# Patient Record
Sex: MALE | Race: WHITE | NOT HISPANIC OR LATINO | Employment: OTHER | URBAN - METROPOLITAN AREA
[De-identification: names, ages, dates, MRNs, and addresses within clinical notes are randomized per-mention and may not be internally consistent; named-entity substitution may affect disease eponyms.]

---

## 2017-02-07 ENCOUNTER — OFFICE VISIT (OUTPATIENT)
Dept: UROLOGY | Facility: CLINIC | Age: 67
End: 2017-02-07
Payer: MEDICARE

## 2017-02-07 VITALS
BODY MASS INDEX: 33.34 KG/M2 | HEIGHT: 71 IN | DIASTOLIC BLOOD PRESSURE: 79 MMHG | RESPIRATION RATE: 16 BRPM | HEART RATE: 66 BPM | SYSTOLIC BLOOD PRESSURE: 144 MMHG | WEIGHT: 238.13 LBS

## 2017-02-07 DIAGNOSIS — C61 PROSTATE CANCER: Primary | ICD-10-CM

## 2017-02-07 PROBLEM — I10 HYPERTENSION: Status: ACTIVE | Noted: 2017-02-07

## 2017-02-07 PROCEDURE — 99203 OFFICE O/P NEW LOW 30 MIN: CPT | Mod: PBBFAC | Performed by: UROLOGY

## 2017-02-07 PROCEDURE — 99204 OFFICE O/P NEW MOD 45 MIN: CPT | Mod: S$PBB,,, | Performed by: UROLOGY

## 2017-02-07 PROCEDURE — 99999 PR PBB SHADOW E&M-NEW PATIENT-LVL III: CPT | Mod: PBBFAC,,, | Performed by: UROLOGY

## 2017-02-07 RX ORDER — ALPRAZOLAM 0.25 MG/1
0.25 TABLET ORAL NIGHTLY PRN
COMMUNITY
End: 2017-10-16 | Stop reason: SDUPTHER

## 2017-02-07 RX ORDER — LOSARTAN POTASSIUM 50 MG/1
25 TABLET ORAL DAILY
COMMUNITY
End: 2018-03-01 | Stop reason: SDUPTHER

## 2017-02-07 NOTE — PROGRESS NOTES
Subjective:       Patient ID: Ryder Senior is a 67 y.o. male.    Chief Complaint: Prostate Cancer (mady 7)    HPI Comments: Ryder Senior is a 67 y.o. male recently diagnosed with prostate cancer. Here with his wife Donita.  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 Was initially diagnosed with a Bruner 6 in 2015 and was on active surveillance and just had re-biopsy on 1/12/17 and [place] 3+4.    Stage- T1C  PSAi- 8.7  Vol- 38 ccs, MRI 32 ccs, no median lobe  MRI-PIRADS 3- left anterior TZ, negative extracapsular extension, SV, nodes  Biopsy (1/12/2017)- [place] 3+4 3/3 30%  FIONA score- 3  Intermediate risk    Past Medical History:    Allergy                                                       Elevated PSA                                                  Hypertension           Past Surgical History:    HERNIA REPAIR                                                  KNEE ARTHROSCOPY                                 2002          corpal tunnel                                    2014          ELBOW ARTHROPLASTY                               2012                                                   Review of Systems   Constitutional: Negative for appetite change, chills, fatigue, fever and unexpected weight change.   HENT: Negative for nosebleeds.    Respiratory: Negative for shortness of breath and wheezing.    Cardiovascular: Negative for chest pain, palpitations and leg swelling.   Gastrointestinal: Negative for abdominal distention, abdominal pain, constipation, diarrhea, nausea and vomiting.   Genitourinary: Negative for difficulty urinating, dysuria and hematuria.   Musculoskeletal: Negative for arthralgias and back pain.   Skin: Negative for pallor.   Neurological: Negative for dizziness, seizures and syncope.   Hematological: Negative for adenopathy.   Psychiatric/Behavioral: Negative for dysphoric mood.       Objective:      Physical Exam   Constitutional: He is oriented to person, place, and time. He  appears well-developed and well-nourished. No distress.   HENT:   Head: Atraumatic.   Neck: No tracheal deviation present.   Cardiovascular: Normal rate.    Pulmonary/Chest: Effort normal. No respiratory distress. He has no wheezes.   Abdominal: Soft. Bowel sounds are normal. He exhibits no distension and no mass. There is no tenderness. There is no rebound and no guarding.   Neurological: He is alert and oriented to person, place, and time.   Skin: Skin is warm and dry. He is not diaphoretic.     Psychiatric: He has a normal mood and affect. His behavior is normal. Judgment and thought content normal.       Assessment:       1. Prostate cancer        Plan:         Today's visit was spent almost entirely on counseling. We reviewed his diagnosis, stage, grade, risk group, and prognosis. We discussed D'Amico and FIONA risk stratification We reviewed the [facility] nomogram. We discussed the concept of low risk, moderate risk, and high risk disease. We discussed the different treatment options including active surveillance (as well as the surveillance protocol), prostate brachytherapy, IMRT, IGRT, cryotherapy, and both open and robotic prostatectomy.We also discussed the advantages, disadvantages, risks and benefits, as well as complications of each option. Regarding radiation therapy we discussed treatment planning, the different techniques, short and long term complications. These included radiation cystitis, radiation proctitis, and impotence. We discussed success, failure, and salvage therapeutic options. We discussed surgical therapy in depth including preoperative preparation, surgical technique (including bladder neck and nerve-sparing techniques), postoperative recuperation and recovery, and short and long term complications including UTI, bleeding, blood clots,catheter dislodgement, etc. We discussed the risks of reoperation, incontinence, impotence, and recurrence. We discussed preop and postop Wayne,  post op penile rehab, and treatment options for incontinence and impotence. We discussed rates of cancer free survival and recurrence, as well as salvage therapeutic options. We discussed the possible  indications for adjuvant radiation therapy. I answered questions and addressed concerns. Discussed high intensity focused ultrasound.  I spent over 45 minutes with the patient. Over 50% of the visit was spent in counseling.

## 2017-02-07 NOTE — MR AVS SNAPSHOT
"    [facility]  [address]  [facility] [address]  Phone: [phone]                  Ryder Senior   2017 3:45 PM   Office Visit    Description:  Male : 1950   Provider:  Tyrese Yanes MD   Department:  [facility]           Reason for Visit     Prostate Cancer           Diagnoses this Visit        Comments    Prostate cancer    -  Primary            To Do List           Goals (5 Years of Data)     None      Follow-Up and Disposition     Return in about 8 weeks (around 2017).      [facility]     [facility] On [facility] Nurse Care Line -  Assistance  Registered nurses in the [facility] On Call Center provide clinical advisement, health education, appointment booking, and other advisory services.  Call for this free service at [phone].             Medications           Message regarding Medications     Verify the changes and/or additions to your medication regime listed below are the same as discussed with your clinician today.  If any of these changes or additions are incorrect, please notify your healthcare provider.             Verify that the below list of medications is an accurate representation of the medications you are currently taking.  If none reported, the list may be blank. If incorrect, please contact your healthcare provider. Carry this list with you in case of emergency.           Current Medications     alprazolam (XANAX) 0.25 MG tablet Take 0.25 mg by mouth nightly as needed for Anxiety.    losartan (COZAAR) 50 MG tablet Take 50 mg by mouth once daily.           Clinical Reference Information           Your Vitals Were     BP Pulse Resp Height Weight BMI    144/79 (BP Location: Right arm, Patient Position: Sitting, BP Method: Automatic) 66 16 5' 11" (1.803 m) 108 kg (238 lb 1.6 oz) 33.21 kg/m2      Blood Pressure          Most Recent Value    BP  (!)  144/79      Allergies as of 2017     Coconut    Pineapple      Immunizations " Administered on Date of Encounter - 2/7/2017     None      [facility] Sign-Up     Activating your [facility] account is as easy as 1-2-3!     1) Visit [email or web address], select Sign Up Now, enter this activation code and your date of birth, then select Next.  [email or web address]  Expires: 3/23/2017 12:00 PM      2) Create a username and password to use when you visit [facility] in the future and select a security question in case you lose your password and select Next.    3) Enter your e-mail address and click Sign Up!    Additional Information  If you have questions, please e-mail [email or web address] or call [phone] to talk to our [facility] staff. Remember, [facility] is NOT to be used for urgent needs. For medical emergencies, dial 911.         Language Assistance Services     ATTENTION: Language assistance services are available, free of charge. Please call [phone].      ATENCIÓN: Si habla español, tiene a samuel disposición servicios gratuitos de asistencia lingüística. Llame al [phone].     MARYANN Ý: N?u b?n nói Ti?ng Vi?t, có các d?ch v? h? tr? ngôn ng? mi?n phí dành cho b?n. G?i s? [phone].         [facility] complies with applicable Federal civil rights laws and does not discriminate on the basis of race, color, national origin, age, disability, or sex.

## 2017-04-04 ENCOUNTER — OFFICE VISIT (OUTPATIENT)
Dept: UROLOGY | Facility: CLINIC | Age: 67
End: 2017-04-04
Payer: MEDICARE

## 2017-04-04 VITALS
SYSTOLIC BLOOD PRESSURE: 126 MMHG | HEIGHT: 71 IN | HEART RATE: 67 BPM | WEIGHT: 233.69 LBS | BODY MASS INDEX: 32.72 KG/M2 | DIASTOLIC BLOOD PRESSURE: 70 MMHG

## 2017-04-04 DIAGNOSIS — C61 PROSTATE CANCER: Primary | ICD-10-CM

## 2017-04-04 PROCEDURE — 99213 OFFICE O/P EST LOW 20 MIN: CPT | Mod: PBBFAC | Performed by: UROLOGY

## 2017-04-04 PROCEDURE — 99213 OFFICE O/P EST LOW 20 MIN: CPT | Mod: S$PBB,,, | Performed by: UROLOGY

## 2017-04-04 PROCEDURE — 99999 PR PBB SHADOW E&M-EST. PATIENT-LVL III: CPT | Mod: PBBFAC,,, | Performed by: UROLOGY

## 2017-04-04 NOTE — PROGRESS NOTES
"Clinic Note  4/4/2017      Subjective:       Patient ID:  Alex is a 67 y.o. male being seen for an new visit.      Chief Complaint:   HPI     Ryder Senior is a 67 y.o. male recently diagnosed with prostate cancer. Here with his wife Donita.  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 Was initially diagnosed with a Luis 6 in 2015 and was on active surveillance and just had re-biopsy on 1/12/17 and [place] 3+4.Had visit 2/7/17 for initial talk.     Stage- T1C  PSAi- 8.7  Vol- 38 ccs, MRI 32 ccs, no median lobe  MRI-PIRADS 3- left anterior TZ, negative extracapsular extension, SV, nodes  Biopsy (1/12/2017)- Luis 3+4 3/3 30%  FIONA score- 3  Intermediate risk    Past Medical History:   Diagnosis Date    Allergy     Hypertension      Family History   Problem Relation Age of Onset    Prostate cancer Father     Alzheimer's disease Father     Cancer Mother     Prostate cancer Maternal Grandfather      Social History     Social History    Marital status:      Spouse name: N/A    Number of children: N/A    Years of education: N/A     Occupational History    Not on file.     Social History Main Topics    Smoking status: Never Smoker    Smokeless tobacco: Not on file    Alcohol use 1.2 oz/week     2 Cans of beer per week    Drug use: No    Sexual activity: Not on file     Other Topics Concern    Not on file     Social History Narrative     Past Surgical History:   Procedure Laterality Date    corpal tunnel  2014    ELBOW ARTHROPLASTY  2012    HERNIA REPAIR      KNEE ARTHROSCOPY  2002     Patient Active Problem List   Diagnosis    Prostate cancer    Hypertension     Review of Systems      Objective:      /70 (BP Location: Right arm, Patient Position: Sitting, BP Method: Automatic)  Pulse 67  Ht 5' 11" (1.803 m)  Wt 106 kg (233 lb 11 oz)  BMI 32.59 kg/m2  Estimated body mass index is 32.59 kg/(m^2) as calculated from the following:    Height as of this encounter: 5' 11" (1.803 m).    " Weight as of this encounter: 106 kg (233 lb 11 oz).  Physical Exam   Constitutional: He is oriented to person, place, and time. He appears well-developed and well-nourished. No distress.   HENT:   Head: Atraumatic.   Neck: No tracheal deviation present.   Cardiovascular: Normal rate.    Pulmonary/Chest: Effort normal. No respiratory distress. He has no wheezes.   Abdominal: Soft. Bowel sounds are normal. He exhibits no distension and no mass. There is no tenderness. There is no rebound and no guarding.   Neurological: He is alert and oriented to person, place, and time.   Skin: Skin is warm and dry. He is not diaphoretic.     Psychiatric: He has a normal mood and affect. His behavior is normal. Judgment and thought content normal.         Assessment and Plan:   Discussed options. Patient went to [facility], interested in clinical trial with high frequency radio-ablation.  We will be available as he goes forward with his plan.        Problem List Items Addressed This Visit     Prostate cancer - Primary          Follow up:       Tyrese Yanes

## 2017-04-04 NOTE — MR AVS SNAPSHOT
"    [facility]  [address]  [facility] [address]  Phone: [phone]                  Ryder Senior   2017 2:00 PM   Office Visit    Description:  Male : 1950   Provider:  Tyrese Yanes MD   Department:  [facility]           Diagnoses this Visit        Comments    Prostate cancer    -  Primary            To Do List           Goals (5 Years of Data)     None      Follow-Up and Disposition     Return if symptoms worsen or fail to improve.      [facility]     [facility] On Call Nurse Care Line -  Assistance  Unless otherwise directed by your provider, please contact [facility], our nurse care line that is available for  assistance.     Registered nurses in the [facility] Center provide: appointment scheduling, clinical advisement, health education, and other advisory services.  Call: [phone] (toll free)               Medications           Message regarding Medications     Verify the changes and/or additions to your medication regime listed below are the same as discussed with your clinician today.  If any of these changes or additions are incorrect, please notify your healthcare provider.             Verify that the below list of medications is an accurate representation of the medications you are currently taking.  If none reported, the list may be blank. If incorrect, please contact your healthcare provider. Carry this list with you in case of emergency.           Current Medications     alprazolam (XANAX) 0.25 MG tablet Take 0.25 mg by mouth nightly as needed for Anxiety.    losartan (COZAAR) 50 MG tablet Take 50 mg by mouth once daily.           Clinical Reference Information           Your Vitals Were     BP Pulse Height Weight BMI    126/70 (BP Location: Right arm, Patient Position: Sitting, BP Method: Automatic) 67 5' 11" (1.803 m) 106 kg (233 lb 11 oz) 32.59 kg/m2      Blood Pressure          Most Recent Value    BP  126/70 "      Allergies as of 4/4/2017     Coconut    Pineapple      Immunizations Administered on Date of Encounter - 4/4/2017     None      Language Assistance Services     ATTENTION: Language assistance services are available, free of charge. Please call [phone].      ATENCIÓN: Si habla kayleen, tiene a samuel disposición servicios gratuitos de asistencia lingüística. Llame al [phone].     CHÚ Ý: N?u b?n nói Ti?ng Vi?t, có các d?ch v? h? tr? ngôn ng? mi?n phí dành cho b?n. G?i s? [phone].         [facility] complies with applicable Federal civil rights laws and does not discriminate on the basis of race, color, national origin, age, disability, or sex.

## 2017-07-11 ENCOUNTER — OFFICE VISIT (OUTPATIENT)
Dept: UROLOGY | Facility: CLINIC | Age: 67
End: 2017-07-11
Payer: MEDICARE

## 2017-07-11 VITALS
SYSTOLIC BLOOD PRESSURE: 130 MMHG | HEART RATE: 68 BPM | DIASTOLIC BLOOD PRESSURE: 77 MMHG | RESPIRATION RATE: 16 BRPM | BODY MASS INDEX: 33.02 KG/M2 | HEIGHT: 71 IN | WEIGHT: 235.88 LBS

## 2017-07-11 DIAGNOSIS — C61 PROSTATE CANCER: Primary | ICD-10-CM

## 2017-07-11 PROCEDURE — 1159F MED LIST DOCD IN RCRD: CPT | Mod: ,,, | Performed by: UROLOGY

## 2017-07-11 PROCEDURE — 99999 PR PBB SHADOW E&M-EST. PATIENT-LVL III: CPT | Mod: PBBFAC,,, | Performed by: UROLOGY

## 2017-07-11 PROCEDURE — 99214 OFFICE O/P EST MOD 30 MIN: CPT | Mod: S$PBB,,, | Performed by: UROLOGY

## 2017-07-11 PROCEDURE — 1126F AMNT PAIN NOTED NONE PRSNT: CPT | Mod: ,,, | Performed by: UROLOGY

## 2017-07-11 PROCEDURE — 99213 OFFICE O/P EST LOW 20 MIN: CPT | Mod: PBBFAC | Performed by: UROLOGY

## 2017-07-11 RX ORDER — VITAMIN E 268 MG
400 CAPSULE ORAL DAILY
COMMUNITY
End: 2017-09-28

## 2017-07-11 NOTE — PROGRESS NOTES
"Clinic Note  7/11/2017      Subjective:         Chief Complaint:   HPI  Ryder Senior is a 67 y.o. male recently diagnosed with prostate cancer. Here with his wife Donita.  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   Stage- T1C  PSAi- 8.7  PSAc- 12.65  Vol- 38 ccs, MRI 32 ccs, no median lobe  MRI-PIRADS 3- left anterior TZ, negative extracapsular extension, SV, nodes  Biopsy (1/12/2017)- Luis 3+4 3/3 30%  FIONA score- 4  Intermediate risk      No results found for: PSA, PSADIAG, PSATOTAL, PSAFREE, PSAFREEPCT   Past Medical History:   Diagnosis Date    Allergy     Hypertension     Prostate cancer      Family History   Problem Relation Age of Onset    Prostate cancer Father     Alzheimer's disease Father     Cancer Mother     Prostate cancer Maternal Grandfather      Social History     Social History    Marital status:      Spouse name: N/A    Number of children: N/A    Years of education: N/A     Occupational History    Not on file.     Social History Main Topics    Smoking status: Never Smoker    Smokeless tobacco: Never Used    Alcohol use 1.2 oz/week     2 Cans of beer per week    Drug use: No    Sexual activity: Not on file     Other Topics Concern    Not on file     Social History Narrative    No narrative on file     Past Surgical History:   Procedure Laterality Date    corpal tunnel  2014    ELBOW ARTHROPLASTY  2012    HERNIA REPAIR      KNEE ARTHROSCOPY  2002     Patient Active Problem List   Diagnosis    Prostate cancer    Hypertension     Review of Systems      Objective:      /77   Pulse 68   Resp 16   Ht 5' 11" (1.803 m)   Wt 107 kg (235 lb 14.3 oz)   BMI 32.90 kg/m²   Estimated body mass index is 32.9 kg/m² as calculated from the following:    Height as " "of this encounter: 5' 11" (1.803 m).    Weight as of this encounter: 107 kg (235 lb 14.3 oz).  Physical Exam      Assessment and Plan:           Problem List Items Addressed This Visit     Prostate cancer - Primary      Other Visit Diagnoses    None.         Follow up:   Today's visit was spent almost entirely on counseling. We reviewed his diagnosis, stage, grade, risk group, and prognosis. We discussed D'Amico and FIONA risk stratification We reviewed the [facility] nomogram. We discussed the concept of low risk, moderate risk, and high risk disease. We discussed the different treatment options including active surveillance (as well as the surveillance protocol), prostate brachytherapy, IMRT, IGRT, cryotherapy, and both open and robotic prostatectomy.We also discussed the advantages, disadvantages, risks and benefits, as well as complications of each option. Regarding radiation therapy we discussed treatment planning, the different techniques, short and long term complications. These included radiation cystitis, radiation proctitis, and impotence. We discussed success, failure, and salvage therapeutic options. We discussed surgical therapy in depth including preoperative preparation, surgical technique (including bladder neck and nerve-sparing techniques), postoperative recuperation and recovery, and short and long term complications including UTI, bleeding, blood clots,catheter dislodgement, etc. We discussed the risks of reoperation, incontinence, impotence, and recurrence. We discussed preop and postop Kegels, post op penile rehab, and treatment options for incontinence and impotence. We discussed rates of cancer free survival and recurrence, as well as salvage therapeutic options. We discussed the possible  indications for adjuvant radiation therapy. I answered questions and addressed concerns.   Did not qualify for radiofrequency ablation trial at [place].  MRI prostate to stage. Me after.  I spent 30 " minutes with the patient. Over 50% of the visit was spent in counseling.     Tyrese Yanes

## 2017-07-27 ENCOUNTER — OFFICE VISIT (OUTPATIENT)
Dept: UROLOGY | Facility: CLINIC | Age: 67
End: 2017-07-27
Payer: MEDICARE

## 2017-07-27 ENCOUNTER — TELEPHONE (OUTPATIENT)
Dept: UROLOGY | Facility: CLINIC | Age: 67
End: 2017-07-27

## 2017-07-27 ENCOUNTER — HOSPITAL ENCOUNTER (OUTPATIENT)
Dept: RADIOLOGY | Facility: HOSPITAL | Age: 67
Discharge: HOME | End: 2017-07-27
Attending: UROLOGY
Payer: MEDICARE

## 2017-07-27 VITALS
HEIGHT: 71 IN | HEART RATE: 72 BPM | WEIGHT: 235.88 LBS | BODY MASS INDEX: 33.02 KG/M2 | RESPIRATION RATE: 15 BRPM | SYSTOLIC BLOOD PRESSURE: 135 MMHG | DIASTOLIC BLOOD PRESSURE: 69 MMHG

## 2017-07-27 DIAGNOSIS — C61 PROSTATE CANCER: Primary | ICD-10-CM

## 2017-07-27 DIAGNOSIS — C61 PROSTATE CANCER: ICD-10-CM

## 2017-07-27 LAB
CREAT SERPL-MCNC: 0.8 MG/DL (ref 0.5–1.4)
SAMPLE: NORMAL

## 2017-07-27 PROCEDURE — 99213 OFFICE O/P EST LOW 20 MIN: CPT | Mod: S$PBB,,, | Performed by: UROLOGY

## 2017-07-27 PROCEDURE — 1126F AMNT PAIN NOTED NONE PRSNT: CPT | Mod: ,,, | Performed by: UROLOGY

## 2017-07-27 PROCEDURE — 72197 MRI PELVIS W/O & W/DYE: CPT | Mod: 26,GC,, | Performed by: RADIOLOGY

## 2017-07-27 PROCEDURE — 99999 PR PBB SHADOW E&M-EST. PATIENT-LVL III: CPT | Mod: PBBFAC,,, | Performed by: UROLOGY

## 2017-07-27 PROCEDURE — 1159F MED LIST DOCD IN RCRD: CPT | Mod: ,,, | Performed by: UROLOGY

## 2017-07-27 PROCEDURE — 99213 OFFICE O/P EST LOW 20 MIN: CPT | Mod: PBBFAC,25 | Performed by: UROLOGY

## 2017-07-27 RX ORDER — GADOBUTROL 604.72 MG/ML
10 INJECTION INTRAVENOUS
Status: COMPLETED | OUTPATIENT
Start: 2017-07-27 | End: 2017-07-27

## 2017-07-27 RX ORDER — SELENIUM 50 MCG
50 TABLET ORAL DAILY
COMMUNITY
End: 2017-09-28

## 2017-07-27 RX ADMIN — GADOBUTROL 10 ML: 604.72 INJECTION INTRAVENOUS at 11:07

## 2017-07-27 NOTE — PROGRESS NOTES
Clinic Note  7/27/2017      Subjective:         Chief Complaint:   HPI  Ryder Senior is a 67 y.o. male recently diagnosed with prostate cancer. Here with his wife Donita.  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   Stage- T1C  PSAi- 8.7  PSAc- 12.65  Vol- 38 ccs, MRI 32 ccs, no median lobe  MRI-PIRADS 3- left anterior TZ, negative extracapsular extension, SV, nodes  Biopsy (1/12/2017)- [place] 3+4 3/3 30%  FIONA score- 4  Intermediate risk  Decipher score- 0.29    MRI done today for re-staging. Not reviewed by radiology yet. To my review 1.6 cm PIRADS 5 left anterior negative extracapsular extension, SV, nodes. Will await their review.      No results found for: PSA, PSADIAG, PSATOTAL, PSAFREE, PSAFREEPCT   Past Medical History:   Diagnosis Date    Allergy     Hypertension     Prostate cancer      Family History   Problem Relation Age of Onset    Prostate cancer Father     Alzheimer's disease Father     Cancer Mother     Prostate cancer Maternal Grandfather      Social History     Social History    Marital status:      Spouse name: N/A    Number of children: N/A    Years of education: N/A     Occupational History    Not on file.     Social History Main Topics    Smoking status: Never Smoker    Smokeless tobacco: Never Used    Alcohol use 1.2 oz/week     2 Cans of beer per week    Drug use: No    Sexual activity: Not on file     Other Topics Concern    Not on file     Social History Narrative    No narrative on file     Past Surgical History:   Procedure Laterality Date    corpal tunnel  2014    ELBOW ARTHROPLASTY  2012    HERNIA REPAIR      KNEE ARTHROSCOPY  2002     Patient Active Problem List   Diagnosis    Prostate cancer    Hypertension     Review of Systems      Objective:  "     /69   Pulse 72   Resp 15   Ht 5' 11" (1.803 m)   Wt 107 kg (235 lb 14.3 oz)   BMI 32.90 kg/m²   Estimated body mass index is 32.9 kg/m² as calculated from the following:    Height as of this encounter: 5' 11" (1.803 m).    Weight as of this encounter: 107 kg (235 lb 14.3 oz).  Physical Exam      Assessment and Plan:           Problem List Items Addressed This Visit     Prostate cancer - Primary      Other Visit Diagnoses    None.         Follow up:   Reviewed MRI images and genetic data as well as clinicopathologic features.  Today's visit was spent almost entirely on counseling. We reviewed his diagnosis, stage, grade, risk group, and prognosis. We discussed D'Amico and FIONA risk stratification We reviewed the [facility] nomogram. We discussed the concept of low risk, moderate risk, and high risk disease. We discussed the different treatment options including active surveillance (as well as the surveillance protocol), prostate brachytherapy, IMRT, IGRT, cryotherapy, and both open and robotic prostatectomy.We also discussed the advantages, disadvantages, risks and benefits, as well as complications of each option. Regarding radiation therapy we discussed treatment planning, the different techniques, short and long term complications. These included radiation cystitis, radiation proctitis, and impotence. We discussed success, failure, and salvage therapeutic options. We discussed surgical therapy in depth including preoperative preparation, surgical technique (including bladder neck and nerve-sparing techniques), postoperative recuperation and recovery, and short and long term complications including UTI, bleeding, blood clots,catheter dislodgement, etc. We discussed the risks of reoperation, incontinence, impotence, and recurrence. We discussed preop and postop Kegels, post op penile rehab, and treatment options for incontinence and impotence. We discussed rates of cancer free survival and " recurrence, as well as salvage therapeutic options. We discussed the possible  indications for adjuvant radiation therapy. I answered questions and addressed concerns.   Has camps in [place], [place] and [place]. [place] [facility].  Patient is interested in surgery.  My nurse will instruct the patient on Kegel exercises today and give them the Kegel exercise instruction sheet.   The patient will meet with our  Ree today to find a date for surgery and begin preparation for surgery. Will also receive our handout on NPO guidelines and preop hydration. Patient will also receive information and education on preoperative skin preparation and postop wound care.  I spent 30 minutes with the patient. Over 50% of the visit was spent in counseling.     Tyrese Yanes

## 2017-07-31 ENCOUNTER — TELEPHONE (OUTPATIENT)
Dept: UROLOGY | Facility: CLINIC | Age: 67
End: 2017-07-31

## 2017-07-31 DIAGNOSIS — C61 PROSTATE CANCER: Primary | ICD-10-CM

## 2017-09-12 ENCOUNTER — ANESTHESIA EVENT (OUTPATIENT)
Dept: SURGERY | Facility: HOSPITAL | Age: 67
DRG: 708 | End: 2017-09-12
Payer: MEDICARE

## 2017-09-12 DIAGNOSIS — C61 PROSTATIC CANCER: Primary | ICD-10-CM

## 2017-09-12 DIAGNOSIS — I10 ESSENTIAL HYPERTENSION: ICD-10-CM

## 2017-09-12 NOTE — PRE ADMISSION SCREENING
Anesthesia Assessment: Preoperative EQUATION    Planned Procedure: Procedure(s) (LRB):  ROBOTIC ASSISTED LAPAROSCOPIC PROSTATECTOMY (N/A)  Requested Anesthesia Type:General  Surgeon: Tyrese Yanes MD  Service: Urology  Known or anticipated Date of Surgery:9/25/2017    Surgeon notes: reviewed; Joaquín 7/27/17                   Optimization:  Anesthesia Preop Clinic Assessment  Indicated        Plan:    Testing:  Hematology Profile, CMP, T&S and EKG   Pre-anesthesia  visit       Visit focus: concerns in complex and/or prolonged anesthesia, airway concerns       Navigation: Tests Scheduled.             Results will be tracked by Preop Clinic.

## 2017-09-12 NOTE — ANESTHESIA PREPROCEDURE EVALUATION
Pre Admission Screening  Michael Pena RN      []Hide copied text  []Hover for attribution information  Anesthesia Assessment: Preoperative EQUATION     Planned Procedure: Procedure(s) (LRB):  ROBOTIC ASSISTED LAPAROSCOPIC PROSTATECTOMY (N/A)  Requested Anesthesia Type:General  Surgeon: Tyrese Yanes MD  Service: Urology  Known or anticipated Date of Surgery:9/25/2017     Surgeon notes: reviewed; Joaquín 7/27/17              Optimization:  Anesthesia Preop Clinic Assessment  Indicated                Plan:    Testing:  Hematology Profile, CMP, T&S and EKG   Pre-anesthesia  visit                                        Visit focus: concerns in complex and/or prolonged anesthesia, airway concerns                             Navigation: Tests Scheduled.                        Results will be tracked by Preop Clinic.                                 Electronically signed by Michael Pena RN at 9/12/2017  3:40 PM        Pre-admit on 9/25/2017            Detailed Report                                                                                                                          09/12/2017  Ryder Senior is a 67 y.o., male.    Anesthesia Evaluation         Review of Systems  Anesthesia Hx:  No problems with previous Anesthesia   Social:  Non-Smoker, Alcohol Use 1-2 beers a week   Hematology/Oncology:  Hematology Normal      Current/Recent Cancer. (prostate)   Cardiovascular:   Hypertension Gardening, fishing, cut trees, mow lawn. [place] acres. Very busy. Denies chest pain or sob   Hepatic/GI:   Denies GERD. Denies Liver Disease.    Musculoskeletal:  Denies Musculoskeletal General/Symptoms  Spine Disorders: lumbar    Neurological:   Denies CVA. Denies Seizures.  Denies Pain    Endocrine:  Endocrine Normal    Dermatological:  Skin Normal    Psych:  Psychiatric Normal           Physical Exam  General:  Well nourished    Airway/Jaw/Neck:  Airway Findings: Mouth Opening: Normal Tongue: Normal   General Airway Assessment: Adult  Mallampati: III  Improves to II with phonation.  TM Distance: Normal, at least 6 cm  Jaw/Neck Findings:  Neck ROM: Normal ROM      Dental:  Dental Findings: (dental implant) molar caps   Chest/Lungs:  Chest/Lungs Findings: Clear to auscultation, Normal Respiratory Rate     Heart/Vascular:  Heart Findings: Rate: Normal  Rhythm: Regular Rhythm  Sounds: Normal        Mental Status:  Mental Status Findings:  Alert and Oriented         Labs and ekg reviewed    Isela Escalante RN 09/19/2017        Anesthesia Plan  Type of Anesthesia, risks & benefits discussed:  Anesthesia Type:  general  Patient's Preference: Opioid Sparing General   Intra-op Monitoring Plan: standard ASA monitors  Intra-op Monitoring Plan Comments:   Post Op Pain Control Plan: multimodal analgesia  Post Op Pain Control Plan Comments:   Induction:   IV  Beta Blocker:  Patient is not currently on a Beta-Blocker (No further documentation required).       Informed Consent: Patient understands risks and agrees with Anesthesia plan.  Questions answered. Anesthesia consent signed with patient.  ASA Score: 2     Day of Surgery Review of History & Physical:    H&P update referred to the surgeon.     Anesthesia Plan Notes: NPO confirmed.   No history of anesthesia problems.         Ready For Surgery From Anesthesia Perspective.

## 2017-09-19 ENCOUNTER — OFFICE VISIT (OUTPATIENT)
Dept: UROLOGY | Facility: CLINIC | Age: 67
End: 2017-09-19
Payer: MEDICARE

## 2017-09-19 ENCOUNTER — HOSPITAL ENCOUNTER (OUTPATIENT)
Dept: PREADMISSION TESTING | Facility: HOSPITAL | Age: 67
Discharge: HOME | End: 2017-09-19
Attending: ANESTHESIOLOGY
Payer: MEDICARE

## 2017-09-19 ENCOUNTER — HOSPITAL ENCOUNTER (OUTPATIENT)
Dept: CARDIOLOGY | Facility: CLINIC | Age: 67
Discharge: HOME | End: 2017-09-19
Attending: ANESTHESIOLOGY
Payer: MEDICARE

## 2017-09-19 VITALS
DIASTOLIC BLOOD PRESSURE: 71 MMHG | WEIGHT: 222.31 LBS | TEMPERATURE: 98 F | HEIGHT: 71 IN | SYSTOLIC BLOOD PRESSURE: 144 MMHG | HEART RATE: 65 BPM | OXYGEN SATURATION: 98 % | BODY MASS INDEX: 31.12 KG/M2 | RESPIRATION RATE: 18 BRPM

## 2017-09-19 DIAGNOSIS — I10 ESSENTIAL HYPERTENSION: ICD-10-CM

## 2017-09-19 DIAGNOSIS — C61 PROSTATE CANCER: ICD-10-CM

## 2017-09-19 PROCEDURE — 99499 UNLISTED E&M SERVICE: CPT | Mod: S$PBB,,, | Performed by: ANESTHESIOLOGY

## 2017-09-19 PROCEDURE — 93010 ELECTROCARDIOGRAM REPORT: CPT | Mod: S$PBB,,, | Performed by: INTERNAL MEDICINE

## 2017-09-19 PROCEDURE — 93005 ELECTROCARDIOGRAM TRACING: CPT | Mod: PBBFAC | Performed by: INTERNAL MEDICINE

## 2017-09-19 RX ORDER — SILDENAFIL 50 MG/1
50 TABLET, FILM COATED ORAL DAILY PRN
COMMUNITY
End: 2018-03-08

## 2017-09-19 NOTE — PROGRESS NOTES
Urology ([facility]) H&P for upcoming procedure  Staff:  Dr. Tyrese Yanes MD    Is this patient in a research study?  No    CC: Prostate adencarcinoma    HPI:  Ryder Senior is a 67 y.o. male with [id number] [place] 3+4=7 prostate adenocarcinoma. Initially diagnosed with a [place] 6 in 2015 and was on active surveillance and re-biopsy on 1/12/17 showed Luis 3+4      The patient initially presented with elevated PSA.  TRUS biopsy revealed the following:       LEFT                             RIGHT  BASE   Atypical small acinar proliferation         benign  MID   benign                                        benign  APEX   [place] 3+4 3/3 30%                benign    Date of Biopsy: 1/12/2017  PSA (6/16/2016): 8.7  Volume: 32 cc (MRI) 38cc (TRUS 1/12/2017)  Voiding complaints: present  ED: present - Cialis or Viagra help well  Incontinence: absent      ROS:  Neg except per HPI, specifically no bone pain, no unintentional weight loss, no anorexia, no night sweats, no dysuria, no fevers.      Past Medical History:   Diagnosis Date    Allergy     Hypertension     Prostate cancer        Past Surgical History:   Procedure Laterality Date    corpal tunnel  2014    ELBOW ARTHROPLASTY  2012    HERNIA REPAIR      KNEE ARTHROSCOPY  2002       Social History     Social History    Marital status:      Spouse name: N/A    Number of children: N/A    Years of education: N/A     Social History Main Topics    Smoking status: Never Smoker    Smokeless tobacco: Never Used    Alcohol use 1.2 oz/week     2 Cans of beer per week    Drug use: No    Sexual activity: Not on file     Other Topics Concern    Not on file     Social History Narrative    No narrative on file       Family History   Problem Relation Age of Onset    Prostate cancer Father     Alzheimer's disease Father     Cancer Mother     Prostate cancer Maternal Grandfather        Review of patient's allergies indicates:   Allergen Reactions     Coconut Hives    Pineapple Hives       Current Outpatient Prescriptions on File Prior to Visit   Medication Sig Dispense Refill    alprazolam (XANAX) 0.25 MG tablet Take 0.25 mg by mouth nightly as needed for Anxiety.      GREEN TEA EXTRACT ORAL Take by mouth.      losartan (COZAAR) 50 MG tablet Take 50 mg by mouth once daily.      multivitamin capsule Take 1 capsule by mouth once daily.      selenium 50 mcg Tab Take 50 mcg by mouth once daily.      vitamin E 400 UNIT capsule Take 400 Units by mouth once daily.       No current facility-administered medications on file prior to visit.        Anticoagulation:  No    Physical Exam:    BMI 32.90    AAOx3, NAD  EOMI, PER, sclerae anicteric, speech normal, tongue midline  Nl effort  Regular Rate  Soft, non-tender, non-distended   Scars: 4 inch right lower abdominal transverse scar (inguinal hernia repair with mesh)  Prostate 40g, no nodules  Penis circumcised    Labs:  Urine dipstick today shows negative for all components.      Lab Results   Component Value Date    WBC 6.00 09/19/2017    HGB 15.9 09/19/2017    HCT 45.8 09/19/2017    MCV 84 09/19/2017     09/19/2017         Chemistry        Component Value Date/Time     09/19/2017 1619    K 4.3 09/19/2017 1619     09/19/2017 1619    CO2 25 09/19/2017 1619    BUN 15 09/19/2017 1619    CREATININE 0.9 09/19/2017 1619    GLU 78 09/19/2017 1619        Component Value Date/Time    CALCIUM 8.8 09/19/2017 1619    ALKPHOS 77 09/19/2017 1619    AST 27 09/19/2017 1619    ALT 23 09/19/2017 1619    BILITOT 0.8 09/19/2017 1619    ESTGFRAFRICA >60.0 09/19/2017 1619    EGFRNONAA >60.0 09/19/2017 1619            Imaging:  MRI 7/27/2017: Left anterior mid gland transitional zone lesion bulging the anterior capsule without definite breakthrough      Assessment: Ryder Senior is a 67 y.o. male with [id number] [place] 3+4=7 prostate adenocarcinoma.      Plan:   1. To OR on 9/25/2017 for RALP with BPLND  2. Consents  signed   3. I have explained the risk, benefits, and alternatives of the procedure in detail. The patient voices understanding and all questions have been answered. The patient agrees to proceed as planned.   4. Patient is not anticoagulated.  5. Pre-op Anesthesia clinic per Anesthesia. Losartan rx'ed by Dr. Aime Gerardo (Internal Medicine).    Iris Reaves MD

## 2017-09-19 NOTE — DISCHARGE INSTRUCTIONS
Your surgery has been scheduled for:__________________________________________    You should report to:  ____[facility], located on the [place] side of the first floor of the           [facility] ([phone])  ____[facility], located on the [facility] side of the            Second floor of the [facility] ([phone])  ____3rd Floor [facility] located on the [facility] side of the [facility] [phone]  Please Note   - Tell your doctor if you take Aspirin, products containing Aspirin, herbal medications  or blood thinners, such as Coumadin, Ticlid, or Plavix.  (Consult your provider regarding holding or stopping before surgery).  - Arrange for someone to drive you home following surgery.  You will not be allowed to leave the surgical facility alone or drive yourself home following sedation and anesthesia.  Before Surgery  - Stop taking all herbal medications 14days prior to surgery  - No Motrin/Advil (Ibuprofen) 7 days before surgery  - No Aleve (Naproxen) 7 days before surgery  - Stop Taking Asprin, products containing Asprin _____days before surgery  - Stop taking blood thinners_______days before surgery  - Refrain from drinking alcoholic beverages for 24hours before and after surgery  - Stop or limit smoking _________days before surgery  Night before Surgery  - DO NOT EAT OR DRINK ANYTHING AFTER MIDNIGHT, INCLUDING GUM, HARD CANDY, MINTS, OR CHEWING TOBACCO.  - Take a shower or bath (shower is recommended).  Bathe with Hibiclens soap or an antibacterial soap from the neck down.  If not supplied by your surgeon, hibiclens soap will need to be purchased over the counter in pharmacy.  Rinse soap off thoroughly.  - Shampoo your hair with your regular shampoo  The Day of Surgery  - Take another bath or shower with hibiclens or any antibacterial soap, to reduce the chance of infection.  - Take heart and blood  pressure medications with a small sip of water, as advised by the perioperative team.  - Do not take fluid pills  - You may brush your teeth and rinse your mouth, but do not swall any additional water.   - Do not apply perfumes, powder, body lotions or deodorant on the day of surgery.  - Nail polish should be removed.  - Do not wear makeup or moisturizer  - Wear comfortable clothes, such as a button front shirt and loose fitting pants.  - Leave all jewelry, including body piercings, and valuables at home.    - Bring any devices you will neeed after surgery such as crutches or canes.  - If you have sleep apnea, please bring your CPAP machine  In the event that your physical condition changes including the onset of a cold or respiratory illness, or if you have to delay or cancel your surgery, please notify your surgeon.  Anesthesia: General Anesthesia  Youre due to have surgery. During surgery, youll be given medication called anesthesia. (It is also called anesthetic.) This will keep you comfortable and pain-free. Your anesthesia provider will use general anesthesia. This sheet tells you more about it.  What is general anesthesia?     You are watched continuously during your procedure by the anesthesia provider   General anesthesia puts you into a state like deep sleep. It goes into the bloodstream (IV anesthetics), into the lungs (gas anesthetics), or both. You feel nothing during the procedure. You will not remember it. During the procedure, the anesthesia provider monitors you continuously. He or she checks your heart rate and rhythm, blood pressure, breathing, and blood oxygen.  · IV Anesthetics. IV anesthetics are given through an IV line in your arm. Theyre often given first. This is so you are asleep before a gas anesthetic is started. Some kinds of IV anesthetics relieve pain. Others relax you. Your doctor will decide which kind is best in your case.  · Gas Anesthetics. Gas anesthetics are breathed into  the lungs. They are often used to keep you asleep. They can be given through a facemask or a tube placed in your larynx or trachea (breathing tube).  ? If you have a facemask, your anesthesia provider will most likely place it over your nose and mouth while youre still awake. Youll breathe oxygen through the mask as your IV anesthetic is started. Gas anesthetic may be added through the mask.  ? If you have a tube in the larynx or trachea, it will be inserted into your throat after youre asleep.  Anesthesia tools and medications  You will likely have:  · IV anesthetics. These are put into an IV line into your bloodstream.  · Gas anesthetics. You breathe these anesthetics into your lungs, where they pass into your bloodstream.  · Pulse oximeter. This is a small clip that is attached to the end of your finger. This measures your blood oxygen level.  · Electrocardiography leads (electrodes). These are small sticky pads that are placed on your chest. They record your heart rate and rhythm.  · Blood pressure cuff. This reads your blood pressure.  Risks and possible complications  General anesthesia has some risks. These include:  · Breathing problems  · Nausea and vomiting  · Sore throat or hoarseness (usually temporary)  · Allergic reaction to the anesthetic  · Irregular heartbeat (rare)  · Cardiac arrest (rare)   Anesthesia safety  · Follow all instructions you are given for how long not to eat or drink before your procedure.  · Be sure your doctor knows what medications and drugs you take. This includes over-the-counter medications, herbs, supplements, alcohol or other drugs. You will be asked when those were last taken.  · Have an adult family member or friend drive you home after the procedure.  · For the first 24 hours after your surgery:  ? Do not drive or use heavy equipment.  ? Have a trusted family member or spouse make important decisions or sign documents.  ? Avoid alcohol.  ? Have a responsible adult stay  with you. He or she can watch for problems and help keep you safe.  Date Last Reviewed: 10/16/2014  © 8628-3802 [employer]. [address] [place] [address], [place] [address]. All rights reserved. This information is not intended as a substitute for professional medical care. Always follow your healthcare professional's instructions.

## 2017-09-22 ENCOUNTER — TELEPHONE (OUTPATIENT)
Dept: UROLOGY | Facility: CLINIC | Age: 67
End: 2017-09-22

## 2017-09-25 ENCOUNTER — HOSPITAL ENCOUNTER (INPATIENT)
Facility: HOSPITAL | Age: 67
LOS: 1 days | Discharge: HOME | DRG: 708 | End: 2017-09-26
Attending: UROLOGY | Admitting: UROLOGY
Payer: MEDICARE

## 2017-09-25 ENCOUNTER — SURGERY (OUTPATIENT)
Age: 67
End: 2017-09-25

## 2017-09-25 ENCOUNTER — ANESTHESIA (OUTPATIENT)
Dept: SURGERY | Facility: HOSPITAL | Age: 67
DRG: 708 | End: 2017-09-25
Payer: MEDICARE

## 2017-09-25 DIAGNOSIS — C61 PROSTATE CANCER: ICD-10-CM

## 2017-09-25 PROCEDURE — 8E0W4CZ ROBOTIC ASSISTED PROCEDURE OF TRUNK REGION, PERCUTANEOUS ENDOSCOPIC APPROACH: ICD-10-PCS | Performed by: UROLOGY

## 2017-09-25 PROCEDURE — 27000221 HC OXYGEN, UP TO 24 HOURS

## 2017-09-25 PROCEDURE — 63600175 PHARM REV CODE 636 W HCPCS: Performed by: STUDENT IN AN ORGANIZED HEALTH CARE EDUCATION/TRAINING PROGRAM

## 2017-09-25 PROCEDURE — 63600175 PHARM REV CODE 636 W HCPCS: Performed by: ANESTHESIOLOGY

## 2017-09-25 PROCEDURE — 63600175 PHARM REV CODE 636 W HCPCS: Performed by: NURSE ANESTHETIST, CERTIFIED REGISTERED

## 2017-09-25 PROCEDURE — 0VT04ZZ RESECTION OF PROSTATE, PERCUTANEOUS ENDOSCOPIC APPROACH: ICD-10-PCS | Performed by: UROLOGY

## 2017-09-25 PROCEDURE — 71000039 HC RECOVERY, EACH ADD'L HOUR: Performed by: UROLOGY

## 2017-09-25 PROCEDURE — C1729 CATH, DRAINAGE: HCPCS | Performed by: UROLOGY

## 2017-09-25 PROCEDURE — 25000003 PHARM REV CODE 250: Performed by: UROLOGY

## 2017-09-25 PROCEDURE — 07TC4ZZ RESECTION OF PELVIS LYMPHATIC, PERCUTANEOUS ENDOSCOPIC APPROACH: ICD-10-PCS | Performed by: UROLOGY

## 2017-09-25 PROCEDURE — 55866 LAPS SURG PRST8ECT RPBIC RAD: CPT | Mod: GC,,, | Performed by: UROLOGY

## 2017-09-25 PROCEDURE — 38571 LAPAROSCOPY LYMPHADENECTOMY: CPT | Mod: 51,GC,, | Performed by: UROLOGY

## 2017-09-25 PROCEDURE — 63600175 PHARM REV CODE 636 W HCPCS: Performed by: UROLOGY

## 2017-09-25 PROCEDURE — 0VT34ZZ RESECTION OF BILATERAL SEMINAL VESICLES, PERCUTANEOUS ENDOSCOPIC APPROACH: ICD-10-PCS | Performed by: UROLOGY

## 2017-09-25 PROCEDURE — 88305 TISSUE EXAM BY PATHOLOGIST: CPT | Performed by: PATHOLOGY

## 2017-09-25 PROCEDURE — 36000713 HC OR TIME LEV V EA ADD 15 MIN: Performed by: UROLOGY

## 2017-09-25 PROCEDURE — 25000003 PHARM REV CODE 250: Performed by: NURSE ANESTHETIST, CERTIFIED REGISTERED

## 2017-09-25 PROCEDURE — 36000712 HC OR TIME LEV V 1ST 15 MIN: Performed by: UROLOGY

## 2017-09-25 PROCEDURE — D9220A PRA ANESTHESIA: Mod: ANES,,, | Performed by: ANESTHESIOLOGY

## 2017-09-25 PROCEDURE — 71000033 HC RECOVERY, INTIAL HOUR: Performed by: UROLOGY

## 2017-09-25 PROCEDURE — 37000009 HC ANESTHESIA EA ADD 15 MINS: Performed by: UROLOGY

## 2017-09-25 PROCEDURE — 0TBC4ZX EXCISION OF BLADDER NECK, PERCUTANEOUS ENDOSCOPIC APPROACH, DIAGNOSTIC: ICD-10-PCS | Performed by: UROLOGY

## 2017-09-25 PROCEDURE — 88309 TISSUE EXAM BY PATHOLOGIST: CPT | Mod: 26,,, | Performed by: PATHOLOGY

## 2017-09-25 PROCEDURE — 11000001 HC ACUTE MED/SURG PRIVATE ROOM

## 2017-09-25 PROCEDURE — 37000008 HC ANESTHESIA 1ST 15 MINUTES: Performed by: UROLOGY

## 2017-09-25 PROCEDURE — 27201423 OPTIME MED/SURG SUP & DEVICES STERILE SUPPLY: Performed by: UROLOGY

## 2017-09-25 PROCEDURE — 94760 N-INVAS EAR/PLS OXIMETRY 1: CPT

## 2017-09-25 PROCEDURE — 88305 TISSUE EXAM BY PATHOLOGIST: CPT | Mod: 26,,, | Performed by: PATHOLOGY

## 2017-09-25 PROCEDURE — D9220A PRA ANESTHESIA: Mod: CRNA,,, | Performed by: NURSE ANESTHETIST, CERTIFIED REGISTERED

## 2017-09-25 PROCEDURE — A4216 STERILE WATER/SALINE, 10 ML: HCPCS | Performed by: NURSE ANESTHETIST, CERTIFIED REGISTERED

## 2017-09-25 RX ORDER — GLYCOPYRROLATE 0.2 MG/ML
INJECTION INTRAMUSCULAR; INTRAVENOUS
Status: DISCONTINUED | OUTPATIENT
Start: 2017-09-25 | End: 2017-09-25

## 2017-09-25 RX ORDER — ONDANSETRON 2 MG/ML
INJECTION INTRAMUSCULAR; INTRAVENOUS
Status: DISCONTINUED | OUTPATIENT
Start: 2017-09-25 | End: 2017-09-25

## 2017-09-25 RX ORDER — FENTANYL CITRATE 50 UG/ML
INJECTION, SOLUTION INTRAMUSCULAR; INTRAVENOUS
Status: DISCONTINUED | OUTPATIENT
Start: 2017-09-25 | End: 2017-09-25

## 2017-09-25 RX ORDER — HYDROMORPHONE HYDROCHLORIDE 1 MG/ML
1 INJECTION, SOLUTION INTRAMUSCULAR; INTRAVENOUS; SUBCUTANEOUS
Status: DISCONTINUED | OUTPATIENT
Start: 2017-09-25 | End: 2017-09-26 | Stop reason: HOSPADM

## 2017-09-25 RX ORDER — HYDROMORPHONE HYDROCHLORIDE 1 MG/ML
0.2 INJECTION, SOLUTION INTRAMUSCULAR; INTRAVENOUS; SUBCUTANEOUS EVERY 5 MIN PRN
Status: DISCONTINUED | OUTPATIENT
Start: 2017-09-25 | End: 2017-09-25 | Stop reason: HOSPADM

## 2017-09-25 RX ORDER — OXYCODONE HYDROCHLORIDE 5 MG/1
5 TABLET ORAL EVERY 4 HOURS PRN
Status: DISCONTINUED | OUTPATIENT
Start: 2017-09-25 | End: 2017-09-26 | Stop reason: HOSPADM

## 2017-09-25 RX ORDER — KETOROLAC TROMETHAMINE 15 MG/ML
15 INJECTION, SOLUTION INTRAMUSCULAR; INTRAVENOUS EVERY 6 HOURS
Status: COMPLETED | OUTPATIENT
Start: 2017-09-25 | End: 2017-09-26

## 2017-09-25 RX ORDER — NEOSTIGMINE METHYLSULFATE 1 MG/ML
INJECTION, SOLUTION INTRAVENOUS
Status: DISCONTINUED | OUTPATIENT
Start: 2017-09-25 | End: 2017-09-25

## 2017-09-25 RX ORDER — ROCURONIUM BROMIDE 10 MG/ML
INJECTION, SOLUTION INTRAVENOUS
Status: DISCONTINUED | OUTPATIENT
Start: 2017-09-25 | End: 2017-09-25

## 2017-09-25 RX ORDER — PROPOFOL 10 MG/ML
VIAL (ML) INTRAVENOUS
Status: DISCONTINUED | OUTPATIENT
Start: 2017-09-25 | End: 2017-09-25

## 2017-09-25 RX ORDER — SODIUM CHLORIDE 0.9 % (FLUSH) 0.9 %
3 SYRINGE (ML) INJECTION
Status: DISCONTINUED | OUTPATIENT
Start: 2017-09-25 | End: 2017-09-25 | Stop reason: HOSPADM

## 2017-09-25 RX ORDER — DEXAMETHASONE SODIUM PHOSPHATE 4 MG/ML
INJECTION, SOLUTION INTRA-ARTICULAR; INTRALESIONAL; INTRAMUSCULAR; INTRAVENOUS; SOFT TISSUE
Status: DISCONTINUED | OUTPATIENT
Start: 2017-09-25 | End: 2017-09-25

## 2017-09-25 RX ORDER — ACETAMINOPHEN 10 MG/ML
INJECTION, SOLUTION INTRAVENOUS
Status: DISCONTINUED | OUTPATIENT
Start: 2017-09-25 | End: 2017-09-25

## 2017-09-25 RX ORDER — SODIUM CHLORIDE 9 MG/ML
INJECTION, SOLUTION INTRAVENOUS CONTINUOUS
Status: DISCONTINUED | OUTPATIENT
Start: 2017-09-25 | End: 2017-09-26

## 2017-09-25 RX ORDER — ONDANSETRON 2 MG/ML
4 INJECTION INTRAMUSCULAR; INTRAVENOUS DAILY PRN
Status: DISCONTINUED | OUTPATIENT
Start: 2017-09-25 | End: 2017-09-25 | Stop reason: HOSPADM

## 2017-09-25 RX ORDER — KETAMINE HYDROCHLORIDE 100 MG/ML
INJECTION, SOLUTION INTRAMUSCULAR; INTRAVENOUS
Status: DISCONTINUED | OUTPATIENT
Start: 2017-09-25 | End: 2017-09-25

## 2017-09-25 RX ORDER — ACETAMINOPHEN 10 MG/ML
1000 INJECTION, SOLUTION INTRAVENOUS EVERY 8 HOURS
Status: COMPLETED | OUTPATIENT
Start: 2017-09-25 | End: 2017-09-26

## 2017-09-25 RX ORDER — LIDOCAINE HYDROCHLORIDE ANHYDROUS AND DEXTROSE MONOHYDRATE .8; 5 G/100ML; G/100ML
INJECTION, SOLUTION INTRAVENOUS CONTINUOUS PRN
Status: DISCONTINUED | OUTPATIENT
Start: 2017-09-25 | End: 2017-09-25

## 2017-09-25 RX ORDER — LOSARTAN POTASSIUM 25 MG/1
25 TABLET ORAL DAILY
Status: DISCONTINUED | OUTPATIENT
Start: 2017-09-25 | End: 2017-09-26 | Stop reason: HOSPADM

## 2017-09-25 RX ORDER — LIDOCAINE HCL/PF 100 MG/5ML
SYRINGE (ML) INTRAVENOUS
Status: DISCONTINUED | OUTPATIENT
Start: 2017-09-25 | End: 2017-09-25

## 2017-09-25 RX ORDER — BUPIVACAINE HYDROCHLORIDE 2.5 MG/ML
INJECTION, SOLUTION EPIDURAL; INFILTRATION; INTRACAUDAL
Status: DISCONTINUED | OUTPATIENT
Start: 2017-09-25 | End: 2017-09-25 | Stop reason: HOSPADM

## 2017-09-25 RX ORDER — HYOSCYAMINE SULFATE 0.12 MG/1
0.12 TABLET SUBLINGUAL EVERY 4 HOURS PRN
Status: DISCONTINUED | OUTPATIENT
Start: 2017-09-25 | End: 2017-09-26 | Stop reason: HOSPADM

## 2017-09-25 RX ORDER — KETOROLAC TROMETHAMINE 30 MG/ML
INJECTION, SOLUTION INTRAMUSCULAR; INTRAVENOUS
Status: DISCONTINUED | OUTPATIENT
Start: 2017-09-25 | End: 2017-09-25

## 2017-09-25 RX ORDER — ALPRAZOLAM 0.25 MG/1
0.25 TABLET ORAL NIGHTLY PRN
Status: DISCONTINUED | OUTPATIENT
Start: 2017-09-25 | End: 2017-09-26 | Stop reason: HOSPADM

## 2017-09-25 RX ORDER — MIDAZOLAM HYDROCHLORIDE 1 MG/ML
INJECTION, SOLUTION INTRAMUSCULAR; INTRAVENOUS
Status: DISCONTINUED | OUTPATIENT
Start: 2017-09-25 | End: 2017-09-25

## 2017-09-25 RX ORDER — ONDANSETRON 2 MG/ML
4 INJECTION INTRAMUSCULAR; INTRAVENOUS EVERY 6 HOURS PRN
Status: DISCONTINUED | OUTPATIENT
Start: 2017-09-25 | End: 2017-09-26 | Stop reason: HOSPADM

## 2017-09-25 RX ORDER — OXYCODONE HYDROCHLORIDE 5 MG/1
10 TABLET ORAL EVERY 4 HOURS PRN
Status: DISCONTINUED | OUTPATIENT
Start: 2017-09-25 | End: 2017-09-26 | Stop reason: HOSPADM

## 2017-09-25 RX ORDER — SODIUM CHLORIDE 9 MG/ML
INJECTION, SOLUTION INTRAVENOUS CONTINUOUS PRN
Status: DISCONTINUED | OUTPATIENT
Start: 2017-09-25 | End: 2017-09-25

## 2017-09-25 RX ADMIN — HYDROMORPHONE HYDROCHLORIDE 0.2 MG: 1 INJECTION, SOLUTION INTRAMUSCULAR; INTRAVENOUS; SUBCUTANEOUS at 12:09

## 2017-09-25 RX ADMIN — EPHEDRINE SULFATE 10 MG: 50 INJECTION, SOLUTION INTRAMUSCULAR; INTRAVENOUS; SUBCUTANEOUS at 08:09

## 2017-09-25 RX ADMIN — KETAMINE HYDROCHLORIDE 40 MG: 100 INJECTION, SOLUTION, CONCENTRATE INTRAMUSCULAR; INTRAVENOUS at 07:09

## 2017-09-25 RX ADMIN — PROPOFOL 90 MG: 10 INJECTION, EMULSION INTRAVENOUS at 07:09

## 2017-09-25 RX ADMIN — ROCURONIUM BROMIDE 50 MG: 10 INJECTION, SOLUTION INTRAVENOUS at 07:09

## 2017-09-25 RX ADMIN — OXYCODONE HYDROCHLORIDE 10 MG: 5 TABLET ORAL at 11:09

## 2017-09-25 RX ADMIN — SODIUM CHLORIDE: 0.9 INJECTION, SOLUTION INTRAVENOUS at 10:09

## 2017-09-25 RX ADMIN — MIDAZOLAM HYDROCHLORIDE 2 MG: 1 INJECTION, SOLUTION INTRAMUSCULAR; INTRAVENOUS at 07:09

## 2017-09-25 RX ADMIN — ROCURONIUM BROMIDE 20 MG: 10 INJECTION, SOLUTION INTRAVENOUS at 08:09

## 2017-09-25 RX ADMIN — MIDAZOLAM HYDROCHLORIDE 1 MG: 1 INJECTION, SOLUTION INTRAMUSCULAR; INTRAVENOUS at 09:09

## 2017-09-25 RX ADMIN — HYOSCYAMINE SULFATE 0.12 MG: 0.12 TABLET ORAL; SUBLINGUAL at 12:09

## 2017-09-25 RX ADMIN — KETOROLAC TROMETHAMINE 15 MG: 15 INJECTION, SOLUTION INTRAMUSCULAR; INTRAVENOUS at 12:09

## 2017-09-25 RX ADMIN — Medication 2 G: at 07:09

## 2017-09-25 RX ADMIN — LIDOCAINE HYDROCHLORIDE 100 MG: 20 INJECTION, SOLUTION INTRAVENOUS at 07:09

## 2017-09-25 RX ADMIN — SODIUM CHLORIDE, SODIUM GLUCONATE, SODIUM ACETATE, POTASSIUM CHLORIDE, MAGNESIUM CHLORIDE, SODIUM PHOSPHATE, DIBASIC, AND POTASSIUM PHOSPHATE: .53; .5; .37; .037; .03; .012; .00082 INJECTION, SOLUTION INTRAVENOUS at 07:09

## 2017-09-25 RX ADMIN — ACETAMINOPHEN 1000 MG: 10 INJECTION, SOLUTION INTRAVENOUS at 07:09

## 2017-09-25 RX ADMIN — MIDAZOLAM HYDROCHLORIDE 1 MG: 1 INJECTION, SOLUTION INTRAMUSCULAR; INTRAVENOUS at 08:09

## 2017-09-25 RX ADMIN — OXYCODONE HYDROCHLORIDE 10 MG: 5 TABLET ORAL at 08:09

## 2017-09-25 RX ADMIN — KETAMINE HYDROCHLORIDE 10 MG: 100 INJECTION, SOLUTION, CONCENTRATE INTRAMUSCULAR; INTRAVENOUS at 08:09

## 2017-09-25 RX ADMIN — ONDANSETRON 4 MG: 2 INJECTION INTRAMUSCULAR; INTRAVENOUS at 10:09

## 2017-09-25 RX ADMIN — ACETAMINOPHEN 1000 MG: 10 INJECTION, SOLUTION INTRAVENOUS at 09:09

## 2017-09-25 RX ADMIN — PROPOFOL 110 MG: 10 INJECTION, EMULSION INTRAVENOUS at 07:09

## 2017-09-25 RX ADMIN — ROCURONIUM BROMIDE 10 MG: 10 INJECTION, SOLUTION INTRAVENOUS at 10:09

## 2017-09-25 RX ADMIN — SODIUM CHLORIDE: 0.9 INJECTION, SOLUTION INTRAVENOUS at 05:09

## 2017-09-25 RX ADMIN — KETOROLAC TROMETHAMINE 15 MG: 15 INJECTION, SOLUTION INTRAMUSCULAR; INTRAVENOUS at 11:09

## 2017-09-25 RX ADMIN — FENTANYL CITRATE 100 MCG: 50 INJECTION, SOLUTION INTRAMUSCULAR; INTRAVENOUS at 07:09

## 2017-09-25 RX ADMIN — EPHEDRINE SULFATE 10 MG: 50 INJECTION, SOLUTION INTRAMUSCULAR; INTRAVENOUS; SUBCUTANEOUS at 07:09

## 2017-09-25 RX ADMIN — DEXMEDETOMIDINE HYDROCHLORIDE 0.5 MCG/KG/HR: 100 INJECTION, SOLUTION, CONCENTRATE INTRAVENOUS at 07:09

## 2017-09-25 RX ADMIN — GLYCOPYRROLATE 0.4 MG: 0.2 INJECTION, SOLUTION INTRAMUSCULAR; INTRAVENOUS at 10:09

## 2017-09-25 RX ADMIN — LIDOCAINE HYDROCHLORIDE 0.03 MG/KG/MIN: 8 INJECTION, SOLUTION INTRAVENOUS at 07:09

## 2017-09-25 RX ADMIN — KETOROLAC TROMETHAMINE 30 MG: 30 INJECTION, SOLUTION INTRAMUSCULAR; INTRAVENOUS at 07:09

## 2017-09-25 RX ADMIN — KETOROLAC TROMETHAMINE 15 MG: 15 INJECTION, SOLUTION INTRAMUSCULAR; INTRAVENOUS at 06:09

## 2017-09-25 RX ADMIN — PROPOFOL 50 MG: 10 INJECTION, EMULSION INTRAVENOUS at 10:09

## 2017-09-25 RX ADMIN — ACETAMINOPHEN 1000 MG: 10 INJECTION, SOLUTION INTRAVENOUS at 02:09

## 2017-09-25 RX ADMIN — EPHEDRINE SULFATE 10 MG: 50 INJECTION, SOLUTION INTRAMUSCULAR; INTRAVENOUS; SUBCUTANEOUS at 09:09

## 2017-09-25 RX ADMIN — NEOSTIGMINE METHYLSULFATE 4 MG: 1 INJECTION INTRAVENOUS at 10:09

## 2017-09-25 RX ADMIN — GLYCOPYRROLATE 0.2 MG: 0.2 INJECTION, SOLUTION INTRAMUSCULAR; INTRAVENOUS at 07:09

## 2017-09-25 RX ADMIN — ROCURONIUM BROMIDE 10 MG: 10 INJECTION, SOLUTION INTRAVENOUS at 09:09

## 2017-09-25 RX ADMIN — OXYCODONE HYDROCHLORIDE 10 MG: 5 TABLET ORAL at 04:09

## 2017-09-25 RX ADMIN — DEXAMETHASONE SODIUM PHOSPHATE 8 MG: 4 INJECTION, SOLUTION INTRAMUSCULAR; INTRAVENOUS at 07:09

## 2017-09-25 RX ADMIN — BUPIVACAINE HYDROCHLORIDE 30 ML: 2.5 INJECTION, SOLUTION EPIDURAL; INFILTRATION; INTRACAUDAL; PERINEURAL at 10:09

## 2017-09-25 NOTE — NURSING TRANSFER
Nursing Transfer Note      9/25/2017     Transfer To: [facility]    Transfer via stretcher     Transfer with none    Transported by pct    Medicines sent: iv fluids    Chart send with patient: Yes    Notified: spouse    Patient reassessed at: 9/25/17 see flowsheets

## 2017-09-25 NOTE — H&P (VIEW-ONLY)
Urology ([facility]) H&P for upcoming procedure  Staff:  Dr. Tyrese Yanes MD    Is this patient in a research study?  No    CC: Prostate adencarcinoma    HPI:  Ryder Senior is a 67 y.o. male with [id number] [place] 3+4=7 prostate adenocarcinoma. Initially diagnosed with a [place] 6 in 2015 and was on active surveillance and re-biopsy on 1/12/17 showed Luis 3+4      The patient initially presented with elevated PSA.  TRUS biopsy revealed the following:       LEFT                             RIGHT  BASE   Atypical small acinar proliferation         benign  MID   benign                                        benign  APEX   [place] 3+4 3/3 30%                benign    Date of Biopsy: 1/12/2017  PSA (6/16/2016): 8.7  Volume: 32 cc (MRI) 38cc (TRUS 1/12/2017)  Voiding complaints: present  ED: present - Cialis or Viagra help well  Incontinence: absent      ROS:  Neg except per HPI, specifically no bone pain, no unintentional weight loss, no anorexia, no night sweats, no dysuria, no fevers.      Past Medical History:   Diagnosis Date    Allergy     Hypertension     Prostate cancer        Past Surgical History:   Procedure Laterality Date    corpal tunnel  2014    ELBOW ARTHROPLASTY  2012    HERNIA REPAIR      KNEE ARTHROSCOPY  2002       Social History     Social History    Marital status:      Spouse name: N/A    Number of children: N/A    Years of education: N/A     Social History Main Topics    Smoking status: Never Smoker    Smokeless tobacco: Never Used    Alcohol use 1.2 oz/week     2 Cans of beer per week    Drug use: No    Sexual activity: Not on file     Other Topics Concern    Not on file     Social History Narrative    No narrative on file       Family History   Problem Relation Age of Onset    Prostate cancer Father     Alzheimer's disease Father     Cancer Mother     Prostate cancer Maternal Grandfather        Review of patient's allergies indicates:   Allergen Reactions     Coconut Hives    Pineapple Hives       Current Outpatient Prescriptions on File Prior to Visit   Medication Sig Dispense Refill    alprazolam (XANAX) 0.25 MG tablet Take 0.25 mg by mouth nightly as needed for Anxiety.      GREEN TEA EXTRACT ORAL Take by mouth.      losartan (COZAAR) 50 MG tablet Take 50 mg by mouth once daily.      multivitamin capsule Take 1 capsule by mouth once daily.      selenium 50 mcg Tab Take 50 mcg by mouth once daily.      vitamin E 400 UNIT capsule Take 400 Units by mouth once daily.       No current facility-administered medications on file prior to visit.        Anticoagulation:  No    Physical Exam:    BMI 32.90    AAOx3, NAD  EOMI, PER, sclerae anicteric, speech normal, tongue midline  Nl effort  Regular Rate  Soft, non-tender, non-distended   Scars: 4 inch right lower abdominal transverse scar (inguinal hernia repair with mesh)  Prostate 40g, no nodules  Penis circumcised    Labs:  Urine dipstick today shows negative for all components.      Lab Results   Component Value Date    WBC 6.00 09/19/2017    HGB 15.9 09/19/2017    HCT 45.8 09/19/2017    MCV 84 09/19/2017     09/19/2017         Chemistry        Component Value Date/Time     09/19/2017 1619    K 4.3 09/19/2017 1619     09/19/2017 1619    CO2 25 09/19/2017 1619    BUN 15 09/19/2017 1619    CREATININE 0.9 09/19/2017 1619    GLU 78 09/19/2017 1619        Component Value Date/Time    CALCIUM 8.8 09/19/2017 1619    ALKPHOS 77 09/19/2017 1619    AST 27 09/19/2017 1619    ALT 23 09/19/2017 1619    BILITOT 0.8 09/19/2017 1619    ESTGFRAFRICA >60.0 09/19/2017 1619    EGFRNONAA >60.0 09/19/2017 1619            Imaging:  MRI 7/27/2017: Left anterior mid gland transitional zone lesion bulging the anterior capsule without definite breakthrough      Assessment: Ryder Senior is a 67 y.o. male with [id number] [place] 3+4=7 prostate adenocarcinoma.      Plan:   1. To OR on 9/25/2017 for RALP with BPLND  2. Consents  signed   3. I have explained the risk, benefits, and alternatives of the procedure in detail. The patient voices understanding and all questions have been answered. The patient agrees to proceed as planned.   4. Patient is not anticoagulated.  5. Pre-op Anesthesia clinic per Anesthesia. Losartan rx'ed by Dr. Aime Gerardo (Internal Medicine).    Iris Reaves MD

## 2017-09-25 NOTE — OP NOTE
9/25/2017      Procedure:   1) laparoscopic radical prostatectomy with robotic assistance  2) laparoscopic bilateral pelvic lymph node dissection    Preop diagnosis: Prostate Cancer  Postop diagnosis: Prostate Cancer, Stage T1C    Surgeon: Rehan Yanes MD (present for the entire procedure)  Assistant: Thuy RAMSAY  EBL: 100 ccs    Wound Class: 2    Specimens removed: prostate, seminal vesicles, lymph nodes, bladder neck biopsy    Drain: Monroy      PROCEDURE NOTE:  Preoperatively the H&P were updated. Also confirmed that patient had had their hibiclens shower and preop hydration. After general endotracheal anesthesia, the patient was carefully positioned, padded, prepped and draped.  Positioning and padding was checked by the surgeon and the circulating nurse.  IV antibiotics were administered within 1 hour prior to making initial skin incision.  An OG tube was placed.  A Monroy catheter was placed.  A timeout was called. The patient's identity was confirmed with 2 identifiers.  The correct procedure, allergies, blood products were verified by the entire operative team.                                                                      A Veress needle was placed at the supraumbilical position and the abdomen insufflated to 15 mmHg.  A 12 mm trocar was placed at this site and a 0 degree camera was introduced. The abdominal cavity was carefully inspected. There was no evidence of trauma to the peritoneal contents, nor any evidence of injury to the retroperitoneum.  All accessory trocars were then placed under direct vision.                                                                             The peritoneum posterior to the bladder was incised, the right vas deferens                     identified and divided.  The right seminal vesicle was gently dissected away from surrounding tissue with care being taken not to cause stretch  or thermal injury to the lateral pedicle. A similar procedure was performed  on  the  left side.  Both seminal vesicles were retracted anteriorly. Denonvilliers fascia was incised and this plane of dissection carried down to the level of the apex of the prostate.  A posterior/lateral release was performed bilaterally.                                                                                                                                            An anterior bladder drop was performed.  After dropping the bladder, a right sided pelvic lymph node dissection was completed and this was sent as permanent pathologic specimen #1.  The endopelvic fascia on the right  was gently dissected posteriorly, thus beginning the lateral release.  A left sided pelvic lymph node dissection was performed and this was sent  as permanent pathologic specimen #2.  Again, the endopelvic fascia was   gently dissected posteriorly, completing the lateral release.  A V stitch was used in a figure of eight fashion to control the dorsal venous complex. Excellent hemostasis was noted at this juncture. The plane between the bladder neck and prostate base was developed and the urethra was divided, a bladder neck sparing approach was utilized.  Excellent preservation of the internal sphincter was achieved circumferentially.     The bladder neck biopsy was sent as permanent section specimen #3.      After dividing the posterior bladder neck, the seminal vesicles and vas ampulla were revisualized and retracted anteriorly. The lateral pedicle on the right was controlled with bipolar cautery.      Cold scissors were used to athermically dissect the neurovascular bundle away  from the capsule of the prostate down to the level of the urethra, thus preserving the right neurovascular bundle.      A similar procedure was performed on the left side.    The urethra at the apex was divided preserving maximal urethral length.The rectourethralis was divided. The specimen was placed in the right colic gutter. The pelvis was  irrigated and  carefully inspected.  There was no evidence of rectal trauma and there was excellent hemostasis.  A vesicourethral anastomosis was then performed with a running suture of 3-0 Monocryl.  After completing the anastomosis, a fresh Monroy catheter was advanced into the bladder and the balloon  inflated.  The bladder was irrigated, there was noted to be no extravasation at the anastomosis. The specimen was placed in an endocatch bag.     Throughout the procedure the first assistant assisted with positioning, trocar placement, docking. She also provided assistance with exposure, visualization, traction/countertraction, suction and irrigation.     A drain .was not placed, specimen was removed from the field and port sites were closed.  Needle and sponge counts were correctThe patient was transferred to the Recovery Room in stable condition.

## 2017-09-25 NOTE — PLAN OF CARE
Problem: Patient Care Overview  Goal: Plan of Care Review  Outcome: Ongoing (interventions implemented as appropriate)  Pt alert and oriented to name, , place and situation. Pt bed in lowest position with call light within reach. Safety precautions maintained. No falls observed or reported, at this time. Pt pain assessed and managed. SCDs in place. Pt offered assistance with toileting. Pt voids per tanner catheter to gravity. Will continue to monitor

## 2017-09-25 NOTE — TRANSFER OF CARE
"Anesthesia Transfer of Care Note    Patient: Ryder Senior    Procedure(s) Performed: Procedure(s) (LRB):  ROBOTIC ASSISTED LAPAROSCOPIC PROSTATECTOMY (N/A)    Patient location: PACU    Anesthesia Type: general    Transport from OR: Transported from OR on 6-10 L/min O2 by face mask with adequate spontaneous ventilation    Post pain: adequate analgesia    Post assessment: no apparent anesthetic complications and tolerated procedure well    Post vital signs: stable    Level of consciousness: responds to stimulation and sedated    Nausea/Vomiting: no nausea/vomiting    Complications: none    Transfer of care protocol was followed      Last vitals:   Visit Vitals  BP (!) 96/53 (BP Location: Left arm, Patient Position: Lying)   Pulse (!) 54   Temp 36.2 °C (97.2 °F) (Temporal)   Resp 16   Ht 5' 11" (1.803 m)   Wt 98.9 kg (218 lb)   SpO2 98%   BMI 30.40 kg/m²     "

## 2017-09-26 VITALS
BODY MASS INDEX: 30.52 KG/M2 | TEMPERATURE: 98 F | DIASTOLIC BLOOD PRESSURE: 62 MMHG | OXYGEN SATURATION: 94 % | RESPIRATION RATE: 18 BRPM | HEIGHT: 71 IN | HEART RATE: 63 BPM | SYSTOLIC BLOOD PRESSURE: 112 MMHG | WEIGHT: 218 LBS

## 2017-09-26 PROCEDURE — 3E0234Z INTRODUCTION OF SERUM, TOXOID AND VACCINE INTO MUSCLE, PERCUTANEOUS APPROACH: ICD-10-PCS | Performed by: UROLOGY

## 2017-09-26 PROCEDURE — 90472 IMMUNIZATION ADMIN EACH ADD: CPT | Performed by: UROLOGY

## 2017-09-26 PROCEDURE — 90662 IIV NO PRSV INCREASED AG IM: CPT | Performed by: UROLOGY

## 2017-09-26 PROCEDURE — 90471 IMMUNIZATION ADMIN: CPT | Performed by: UROLOGY

## 2017-09-26 PROCEDURE — 90732 PPSV23 VACC 2 YRS+ SUBQ/IM: CPT | Performed by: UROLOGY

## 2017-09-26 PROCEDURE — 25000003 PHARM REV CODE 250: Performed by: UROLOGY

## 2017-09-26 PROCEDURE — 63600175 PHARM REV CODE 636 W HCPCS: Performed by: UROLOGY

## 2017-09-26 PROCEDURE — G0008 ADMIN INFLUENZA VIRUS VAC: HCPCS | Performed by: UROLOGY

## 2017-09-26 PROCEDURE — G0009 ADMIN PNEUMOCOCCAL VACCINE: HCPCS | Performed by: UROLOGY

## 2017-09-26 RX ORDER — SULFAMETHOXAZOLE AND TRIMETHOPRIM 800; 160 MG/1; MG/1
1 TABLET ORAL 2 TIMES DAILY
Qty: 6 TABLET | Refills: 0 | Status: ON HOLD | OUTPATIENT
Start: 2017-09-26 | End: 2017-10-02 | Stop reason: HOSPADM

## 2017-09-26 RX ORDER — POLYETHYLENE GLYCOL 3350 17 G/17G
17 POWDER, FOR SOLUTION ORAL DAILY
Qty: 30 PACKET | Refills: 0 | Status: SHIPPED | OUTPATIENT
Start: 2017-09-26 | End: 2018-01-04

## 2017-09-26 RX ORDER — ACETAMINOPHEN 10 MG/ML
1000 INJECTION, SOLUTION INTRAVENOUS EVERY 8 HOURS
Status: DISCONTINUED | OUTPATIENT
Start: 2017-09-26 | End: 2017-09-26 | Stop reason: HOSPADM

## 2017-09-26 RX ORDER — OXYCODONE AND ACETAMINOPHEN 5; 325 MG/1; MG/1
1 TABLET ORAL EVERY 4 HOURS PRN
Qty: 41 TABLET | Refills: 0 | Status: SHIPPED | OUTPATIENT
Start: 2017-09-26 | End: 2018-01-04

## 2017-09-26 RX ADMIN — INFLUENZA A VIRUSA/MICHIGAN/45/2015 X-275 (H1N1) ANTIGEN (FORMALDEHYDE INACTIVATED), INFLUENZA A VIRUS A/HONG KONG/4801/2014 X-263B (H3N2) ANTIGEN (FORMALDEHYDE INACTIVATED), AND INFLUENZA B VIRUS B/BRISBANE/60/2008 ANTIGEN (FORMALDEHYDE INACTIVATED) 0.5 ML: 60; 60; 60 INJECTION, SUSPENSION INTRAMUSCULAR at 11:09

## 2017-09-26 RX ADMIN — HYOSCYAMINE SULFATE 0.12 MG: 0.12 TABLET ORAL; SUBLINGUAL at 11:09

## 2017-09-26 RX ADMIN — OXYCODONE HYDROCHLORIDE 10 MG: 5 TABLET ORAL at 05:09

## 2017-09-26 RX ADMIN — OXYCODONE HYDROCHLORIDE 10 MG: 5 TABLET ORAL at 01:09

## 2017-09-26 RX ADMIN — OXYCODONE HYDROCHLORIDE 10 MG: 5 TABLET ORAL at 11:09

## 2017-09-26 RX ADMIN — KETOROLAC TROMETHAMINE 15 MG: 15 INJECTION, SOLUTION INTRAMUSCULAR; INTRAVENOUS at 05:09

## 2017-09-26 RX ADMIN — PNEUMOCOCCAL VACCINE POLYVALENT 0.5 ML
25; 25; 25; 25; 25; 25; 25; 25; 25; 25; 25; 25; 25; 25; 25; 25; 25; 25; 25; 25; 25; 25; 25 INJECTION, SOLUTION INTRAMUSCULAR; SUBCUTANEOUS at 11:09

## 2017-09-26 RX ADMIN — ACETAMINOPHEN 1000 MG: 10 INJECTION, SOLUTION INTRAVENOUS at 05:09

## 2017-09-26 NOTE — ASSESSMENT & PLAN NOTE
- IV tylenol, PO oxycodone for pain control  - regular diet  - dc MIVF  - Ambulate pm of surgery and qid  - Drains: Home with tanner, leg bag,  bag, and teaching  - Prophylaxis: IS, SCDs, GI ppx

## 2017-09-26 NOTE — DISCHARGE INSTRUCTIONS
What to expect with your Robotic Assisted Laparoscopic Prostatectomy.  [facility]  Updated 06/10/2011   Surgery  o Your surgery will last between 1.5 - 3 hours.   After surgery  o You may or may not have a drain that is shaped like a grenade and put to suction  - This drain usually comes out on Post Op Day (POD) 1. It will remain if the output is high and the nurses will teach you how to record the output and you will come back a few days after you leave to have the drain removed  o You will have a catheter after your surgery.  - This catheter protects your tissues to allow for healing between the bladder neck and the urethra now that the prostate has been removed.  - NO ONE IS TO REMOVE THIS CATHETER OR CHANGE THE CATHETER OTHER THAN SOMEONE FROM [facility].  - If you have to go to the ER because your catheter is not draining, come to the [facility] if possible and they will call us if they are not able to irrigate your catheter.  - If you live out of town and have to go to a local ER, then DO NOT let that doctor remove your catheter. If they are unable to irrigate the catheter or are having troubles with it, have them page the [facility] resident on call immediately at [phone] to help answer any questions.  - The catheter should come out in 7-10 days.   - You will have a midline incision after surgery where the prostate was removed. This will be sewn with absorbable suture so you do not need to worry about having the sutures removed.  - You will have smaller incisions where the instruments were inserted that are also sewn closed with absorbable sutures.  o The night of surgery we expect and hope that you will:  - Walk - walking helps get the bowels moving. Also after your surgery, you are at a risk for a deep venous thrombosis (which is a clot in the legs that can form by remaining inactive or still for extended periods of time) and this can travel to your lungs and make you  feel short of breath. This is a very serious condition. Walking helps prevent a DVT from occurring.  - Eat - you do not have to eat a whole meal, but we want to make sure you can tolerate liquid and/or solid food without nausea and vomiting  - Use your incentive spirometer - this is the breathing apparatus that helps you expand your lungs. If and when you have pain you will not want to take deep breaths. But if you dont take deep breaths, you are at risk for pneumonia. The incentive spirometer will help prevent this from occurring by expanding your lungs.  o Symptoms you may experience immediately post-op:  - Bloating and/or shoulder pain - when we do this operation, we fill up your abdominal cavity with gas to better help us visualize the organs and allow our instruments to fit. After the surgery, not all the air can be removed and your body will eventually absorb this small amount of air. However this can make you feel bloated. In addition, when you sit up, the air can sit right under a muscle (the diaphragm) which has connecting nerves to the shoulders, which could explain why you have shoulder pain.  - Do not expect necessarily to have gas or to have a bowel movement - this goes along with the bloating, you may feel like you want to pass gas or have a bowel movement but you cant. This is normal and you will feel like this for a couple days. There are no pills to help with this. Small walks throughout the day should help with this.  - Pain - your pain should be able to be controlled with medicines by mouth that we prescribe. It is important for you to tell us if you are on any pain medications at home before the surgery as you may need stronger pain meds while in the hospital.  - Bladder spasms - this feels like you have to urinate but cant. Sometimes it can be due to clots clogging up your catheter. The nurse will irrigate the catheter, if there are no clots we can prescribe you an anti-spasmodic. We can also  send you home with a prescription for one.   If you go home on anti-spasmodics, do not take one the morning of your appointment to have your catheter removed or you may not be able to void.   You can go home when:  o Pain is controlled with medicines by mouth  o You are able to walk without difficulty or pain  o You are tolerating a regulating diet  o Your catheter is draining freely   When you go home:  o Catheter care  - Blood in your urine (hematuria) is normal. However if you are having clots or your catheter stops draining and you are experiencing abdominal pain, go to the ER.  - If the tip of your penis hurts with the catheter in place, you can put some Vaseline at the end of the catheter to help lubricate the catheter.  o Activity  - Continue to walk - small walks throughout the day are better than one long walk.   - Do not lift anything greater than 8 pounds for 6 weeks - we want your abdominal wall muscles to heal.  o Bowel Movements - Do not strain to have a bowel movement - the pain medicines will make you constipated. That is why we also ask you to take colace 2-3 x per day to help keep your bowels regular. If you are still having trouble, then you can also add Miralax once a day. Do not take any stool softeners if you are having diarrhea.  o Drain - If you have a drain (not your catheter, but a separate drain) then record the output and bring it with you to your next appointment  o Smoking - If you smoke, we encourage you to STOP. Smoking interferes with the healing process and will prolong your healing with continued smoking.  o Driving - Do not drive while you are on pain meds or with your catheter in place.  o Bathing - If you do not have a drain, you can shower 48 hours after your surgery. If you do have a drain, sponge bathe only until the drain is out.  o Dressing - you can remove the dressings if there is no drainage or change them as needed if there is. The little sterile band-aid strips will  fall off on their own in 10-14 days. If they have not fallen off then you can remove them yourself after 14 days.  o Kegels - do not start your Kegels until after your catheter is out.  o Restarting medicines -especially blood thinners (Coumadin, ASA,Plavix), Fish Oil. Discuss this with your physician prior to discharge   When to return to the ER  o Fever - If you have a fever >101.5, this could be due to a number of reasons such as infection of the urine or incision. If your catheter has been removed, you could possibly have a leak. It would be best to come to the ER so they can better evaluate you.  o Severe pain - pain is expected, but severe or new onset of pain is not normal.   o Inability to tolerate food or liquid with nausea and vomiting - it would be best to go to the ER for them to better evaluate you.      Consents to be obtained - Surgical and Blood.  Before you sign the consent, understand the following risks:  o Surgery consent  - Bleeding - the prostate and its surrounding vessels are very vascular and bleeding can occur requiring a blood transfusion.  - Erectile dysfunction (impotence) - you will not have erections after surgery immediately post-op.  Your potency depends on your age and if you were potent beforehand. Nerve-sparing also helps. However, it does not guarantee erectile function. There are options to discuss after surgery if you still remain impotent.  - Incontinence -95% of people regain their continence by one year. How fast you become continent again cannot be determined beforehand. Kegels, however, do help and you should start your kegels after your catheter is out.   - Blockage of ureters (the drainage tube between the kidney and the bladder)- if this occurrs, you will need another procedure to unblock the ureters.  - Stricture of the urethra or Bladder Neck Contracture - any time the urethra is instrumented, there is a risk of having strictures and this would require another  procedure to dilate the stricture. Bladder neck contracture can occur after any type of anastomotic procedure. You would present with difficulty voiding or emptying your bladder.  - Damage to rectal wall, bowel, liver, spleen or any of the surrounding organs   - Infection of urine or incision requiring further treatment  - Pulmonary Emboli (blood clots) - this is why we ask you to walk around after surgery and the night of surgery to help prevent clots  - Need for conversion to open procedure   - Air embolus - to introduce air into the abdomen a needle is inserted into the abdomen, if this enters a vein or artery it can cause an embolus that travels to the heart and cause death.  - Anastomotic leak - there is always a chance that the tissues have not healed when the catheter is removed and you may possibly have a leak. You may possibly present or complain of abdominal pain, weakness or fever. If you have these symptoms return to the ER. If you have a leak you will need a catheter to be replaced, likely under direct vision with a camera.   - Small bowel obstruction (SBO) - after surgery, people can have adhesions that form from inflammation caused by surgery and this can cause a SBO, which would present with nausea and vomiting and unable to pass stool.  - Ileus - your bowels can be in shock after surgery and you will present with nausea and vomiting. Sometimes this requires a tube that is placed into your stomach until your bowels start working again, this is temporary.  - Acute or chronic pain  - Re-insertion of catheter if there is a urinary leak  - Scars  - Death, paralysis from the neck or waist down, loss of limb  o Blood consents  - Risk of HIV and Hepatitis or any other blood borne disease  - Risk of allergic reaction to blood treated symptomatically  -

## 2017-09-26 NOTE — DISCHARGE SUMMARY
[facility]  Urology  Discharge Summary      Patient Name: Ryder Senior  MRN: [id number]  Admission Date: 9/25/2017  Hospital Length of Stay: 1 days  Discharge Date and Time: 9/26/2017 11:38 AM  Attending Physician: Tyrese Yanes MD  Discharging Provider: La Polanco MD  Primary Care Physician: Aime Gerardo MD    HPI:   Ryder Senior is a 67 y.o. male with bBabP6Hh Luis 3+4=7 prostate adenocarcinoma. Initially diagnosed with a Luis 6 in 2015 and was on active surveillance and re-biopsy on 1/12/17 showed Luis 3+4    Procedure(s) (LRB):  ROBOTIC ASSISTED LAPAROSCOPIC PROSTATECTOMY (N/A)     Indwelling Lines/Drains at time of discharge:   Lines/Drains/Airways     Drain                 Urethral Catheter 09/25/17 0743 Straight-tip;Non-latex 18 Fr. 1 day                Hospital Course (synopsis of major diagnoses, care, treatment, and services provided during the course of the hospital stay): Patient was admitted following RALP. He tolerated the procedure well with no complications. On POD 1 his pain was well controlled, he was ambulating and tolerating a regular diet. He was discharged home.     Consults:     Significant Diagnostic Studies: see chart review    Pending Diagnostic Studies:     Procedure Component Value Units Date/Time    Basic metabolic panel [[id number]] Collected:  09/25/17 1041    Order Status:  Sent Lab Status:  In process Updated:  09/25/17 1041    Specimen:  Blood from Blood     CBC auto differential [[id number]] Collected:  09/25/17 1041    Order Status:  Sent Lab Status:  In process Updated:  09/25/17 1041    Specimen:  Blood from Blood           Final Active Diagnoses:    Diagnosis Date Noted POA    PRINCIPAL PROBLEM:  Prostate cancer [C61] 02/07/2017 Yes      Problems Resolved During this Admission:    Diagnosis Date Noted Date Resolved POA       Discharged Condition: good    Disposition: Home or Self Care    Follow Up:  Follow-up Information     Tyrese MUIR  MD Joaquín In 1 week.    Specialty:  Urology  Why:  post op tanner removal  Contact information:  [address]  [facility] [id number]  [phone]                 Patient Instructions:     Diet general     Call MD for:  temperature >100.4     Call MD for:  persistent nausea and vomiting or diarrhea     Call MD for:  severe uncontrolled pain     Call MD for:  redness, tenderness, or signs of infection (pain, swelling, redness, odor or green/yellow discharge around incision site)     Call MD for:  difficulty breathing or increased cough     Call MD for:  severe persistent headache     Call MD for:  worsening rash     Call MD for:  persistent dizziness, light-headedness, or visual disturbances     Call MD for:  increased confusion or weakness     No dressing needed       Medications:  Reconciled Home Medications:   Discharge Medication List as of 9/26/2017 11:33 AM      START taking these medications    Details   oxycodone-acetaminophen (PERCOCET) 5-325 mg per tablet Take 1 tablet by mouth every 4 (four) hours as needed for Pain., Starting Tue 9/26/2017, Print      polyethylene glycol (GLYCOLAX) 17 gram PwPk Take 17 g by mouth once daily., Starting Tue 9/26/2017, Print      sulfamethoxazole-trimethoprim 800-160mg (BACTRIM DS) 800-160 mg Tab Take 1 tablet by mouth 2 (two) times daily., Starting Tue 9/26/2017, Until Fri 9/29/2017, Print         CONTINUE these medications which have NOT CHANGED    Details   alprazolam (XANAX) 0.25 MG tablet Take 0.25 mg by mouth nightly as needed for Anxiety., Until Discontinued, Historical Med      losartan (COZAAR) 50 MG tablet Take 25 mg by mouth once daily. Patient takes 0.5 to 1 whole tablet at night, Historical Med      GREEN TEA EXTRACT ORAL Take by mouth., Historical Med      multivitamin capsule Take 1 capsule by mouth once daily., Historical Med      selenium 50 mcg Tab Take 50 mcg by mouth once daily., Historical Med      sildenafil (VIAGRA) 50 MG tablet Take 50 mg by  mouth daily as needed for Erectile Dysfunction., Historical Med      vitamin E 400 UNIT capsule Take 400 Units by mouth once daily., Historical Med             Time spent on the discharge of patient: 20 minutes    La Polanco MD  Urology  [facility]

## 2017-09-26 NOTE — PROGRESS NOTES
[facility]  Urology  Progress Note    Patient Name: Ryder Senior  MRN: [id number]  Admission Date: 9/25/2017  Hospital Length of Stay: 1 days  Code Status: No Order   Attending Provider: Tyrese Yanes MD   Primary Care Physician: Aime Gerardo MD    Subjective:     HPI:  Ryder Senior is a 67 y.o. male POD 1 s/p RALP    Interval History:   No acute events overnight  Pain well controlled  No nausea, tolerating diet  Ambulated last night    Review of Systems  Objective:     Temp:  [95.5 °F (35.3 °C)-98.4 °F (36.9 °C)] 98.4 °F (36.9 °C)  Pulse:  [46-70] 59  Resp:  [8-18] 18  SpO2:  [94 %-100 %] 94 %  BP: ()/(27-69) 122/57     Body mass index is 30.4 kg/m².            Drains     Drain                 Urethral Catheter 09/25/17 0743 Straight-tip;Non-latex 18 Fr. less than 1 day                Physical Exam   Constitutional: No distress.   HENT:   Head: Normocephalic and atraumatic.   Eyes: Conjunctivae are normal. No scleral icterus.   Neck: Normal range of motion.   Cardiovascular: Normal rate.    Pulmonary/Chest: Effort normal. No respiratory distress.   Abdominal: Soft. He exhibits no distension. There is tenderness (appropriate). There is no rebound and no guarding.   Incisions c/d/i  18 Fr tanner draining clear yellow   Musculoskeletal: Normal range of motion.   SCDs in place   Neurological: He is alert.   Skin: Skin is warm and dry. He is not diaphoretic.     Psychiatric: He has a normal mood and affect.       Significant Labs:    BMP:    Recent Labs  Lab 09/19/17  1619      K 4.3      CO2 25   BUN 15   CREATININE 0.9   CALCIUM 8.8       CBC:     Recent Labs  Lab 09/19/17  1619   WBC 6.00   HGB 15.9   HCT 45.8          All pertinent labs results from the past 24 hours have been reviewed.    Significant Imaging:  All pertinent imaging results/findings from the past 24 hours have been reviewed.        Assessment/Plan:     * Prostate cancer    - IV tylenol, PO  oxycodone for pain control  - regular diet  - dc MIVF  - Ambulate pm of surgery and qid  - Drains: Home with tanner, leg bag,  bag, and teaching  - Prophylaxis: IS, SCDs, GI ppx              VTE Risk Mitigation         Ordered     Medium Risk of VTE  Once      09/25/17 0528     Place sequential compression device  Until discontinued      09/25/17 0528          La Polanco MD  Urology  [facility]

## 2017-09-26 NOTE — PLAN OF CARE
Problem: Patient Care Overview  Goal: Individualization & Mutuality  Outcome: Ongoing (interventions implemented as appropriate)  Pt is progressing with plan of care. Frequent rounds made to assess pain and safety. Pt's pain controlled with pain medication ordered at this time. Bed locked and at lowest position. Side rails up x 2. Call light within reach. Will continue to monitor.

## 2017-09-26 NOTE — SUBJECTIVE & OBJECTIVE
Interval History:   No acute events overnight  Pain well controlled  No nausea, tolerating diet  Ambulated last night    Review of Systems  Objective:     Temp:  [95.5 °F (35.3 °C)-98.4 °F (36.9 °C)] 98.4 °F (36.9 °C)  Pulse:  [46-70] 59  Resp:  [8-18] 18  SpO2:  [94 %-100 %] 94 %  BP: ()/(27-69) 122/57     Body mass index is 30.4 kg/m².            Drains     Drain                 Urethral Catheter 09/25/17 0743 Straight-tip;Non-latex 18 Fr. less than 1 day                Physical Exam   Constitutional: No distress.   HENT:   Head: Normocephalic and atraumatic.   Eyes: Conjunctivae are normal. No scleral icterus.   Neck: Normal range of motion.   Cardiovascular: Normal rate.    Pulmonary/Chest: Effort normal. No respiratory distress.   Abdominal: Soft. He exhibits no distension. There is tenderness (appropriate). There is no rebound and no guarding.   Incisions c/d/i  18 Fr tanner draining clear yellow   Musculoskeletal: Normal range of motion.   SCDs in place   Neurological: He is alert.   Skin: Skin is warm and dry. He is not diaphoretic.     Psychiatric: He has a normal mood and affect.       Significant Labs:    BMP:    Recent Labs  Lab 09/19/17  1619      K 4.3      CO2 25   BUN 15   CREATININE 0.9   CALCIUM 8.8       CBC:     Recent Labs  Lab 09/19/17  1619   WBC 6.00   HGB 15.9   HCT 45.8          All pertinent labs results from the past 24 hours have been reviewed.    Significant Imaging:  All pertinent imaging results/findings from the past 24 hours have been reviewed.

## 2017-09-26 NOTE — PROGRESS NOTES
Pt was assisted ambulating in the hallway. Pt walked the length of the hallway then returned to their bed, remaining free from falls or other injuries. Pt tolerated activity well. Will continue to monitor.

## 2017-09-28 PROBLEM — K56.7 ILEUS: Status: ACTIVE | Noted: 2017-09-28

## 2017-09-28 NOTE — ANESTHESIA POSTPROCEDURE EVALUATION
"Anesthesia Post Evaluation    Patient: Ryder Senior    Procedure(s) Performed: Procedure(s) (LRB):  ROBOTIC ASSISTED LAPAROSCOPIC PROSTATECTOMY (N/A)    Final Anesthesia Type: general  Patient location during evaluation: PACU  Patient participation: Yes- Able to Participate  Level of consciousness: awake and alert  Post-procedure vital signs: reviewed and stable  Pain management: adequate  Airway patency: patent  PONV status at discharge: No PONV  Anesthetic complications: no      Cardiovascular status: blood pressure returned to baseline  Respiratory status: unassisted  Hydration status: euvolemic  Follow-up not needed.        Visit Vitals  /62 (BP Location: Right arm, Patient Position: Lying)   Pulse 63   Temp 36.5 °C (97.7 °F) (Oral)   Resp 18   Ht 5' 11" (1.803 m)   Wt 98.9 kg (218 lb)   SpO2 (!) 94%   BMI 30.40 kg/m²       Pain/Lisa Score: No Data Recorded      "

## 2017-09-28 NOTE — PHYSICIAN QUERY
PT Name: Ryder Senior  MR #: [id number]     Physician Query Form - Documentation Clarification      CDS/: Jodi Landrum RN  CCDS               Contact information: [email or web address]    This form is a permanent document in the medical record.     Query Date: September 28, 2017    By submitting this query, we are merely seeking further clarification of documentation. Please utilize your independent clinical judgment when addressing the question(s) below.    The Medical record reflects the following:    Supporting Clinical Findings Location in Medical Record    Procedure:   1) laparoscopic radical prostatectomy with robotic assistance   2) laparoscopic bilateral pelvic lymph node dissection    After dropping the bladder, a right sided pelvic lymph node dissection was completed and this was sent as permanent pathologic specimen #1.    A left sided pelvic lymph node dissection was performed and this was sent as permanent pathologic specimen #2    OP note 9/25                                                                              Doctor, Please specify diagnosis or diagnoses associated with above clinical findings.  Please clarify the extent of the bilateral pelvic lymph node dissection:    Provider Use Only      [x] complete bilateral lymph node dissection      [  ] partial bilateral lymph node dissection      [  ] clinically undetermined

## 2017-10-02 ENCOUNTER — TELEPHONE (OUTPATIENT)
Dept: UROLOGY | Facility: CLINIC | Age: 67
End: 2017-10-02

## 2017-10-02 PROBLEM — A04.72 C. DIFFICILE COLITIS: Status: ACTIVE | Noted: 2017-10-02

## 2017-10-02 PROBLEM — K56.7 ILEUS: Status: RESOLVED | Noted: 2017-09-28 | Resolved: 2017-10-02

## 2017-10-02 NOTE — TELEPHONE ENCOUNTER
I spoke with patient, who stated he was readmitted into the hospital for c-diff infection and is not happy with his care there and wanted suggestions, I advised patient that  was in surgery today but that I will send message to  to call patient as soon as he is able..

## 2017-10-03 ENCOUNTER — TELEPHONE (OUTPATIENT)
Dept: UROLOGY | Facility: CLINIC | Age: 67
End: 2017-10-03

## 2017-10-03 NOTE — TELEPHONE ENCOUNTER
Spoke to Milvia MILTON RN. Dr. Workman says to have the pt keep the catheter until Friday. If pt goes home before Friday he will be discharged with the catheter in place and can f/u in the office.

## 2017-10-03 NOTE — TELEPHONE ENCOUNTER
----- Message from Shayy Crow sent at 10/3/2017 10:43 AM CDT -----  Contact: [facility]   States that they have some information about the X-Ray and needs a clearance to take the Monroy out.  Can you please call Kinsey back at extenstion:  [phone]    Thank you

## 2017-10-04 PROBLEM — I82.401 ACUTE DEEP VEIN THROMBOSIS (DVT) OF RIGHT LOWER EXTREMITY: Status: ACTIVE | Noted: 2017-10-04

## 2017-10-05 ENCOUNTER — TELEPHONE (OUTPATIENT)
Dept: UROLOGY | Facility: CLINIC | Age: 67
End: 2017-10-05

## 2017-10-05 NOTE — TELEPHONE ENCOUNTER
----- Message from Emma Milan sent at 10/5/2017  2:00 PM CDT -----  Contact: self   Placed call to pod, patient missed a call from your office. Please call back at [phone] (home) patient in room 216 at hospital

## 2017-10-05 NOTE — TELEPHONE ENCOUNTER
I CALLED PT'S CELL PHONE TO ANSWER HIS QUESTION REGARDING THE HEMATURIA. HE WANTS HIS DAILY CATHETER REMOVED. I LEFT A MESSAGE

## 2017-10-05 NOTE — TELEPHONE ENCOUNTER
----- Message from Loly Leigh sent at 10/5/2017  9:50 AM CDT -----  Contact Milvia concerning patient folres chatterjee, states has blood in it , started Eliquis yesterday, is requesting to remove today,  it contact number [phone]      Thank you

## 2017-10-05 NOTE — TELEPHONE ENCOUNTER
----- Message from Barbara Méndez sent at 10/5/2017  1:00 PM CDT -----  Contact: self  Patient missed a from Dr Workman himself  Please call him back at    Thanks

## 2017-10-05 NOTE — TELEPHONE ENCOUNTER
Spoke to paige and the pt was started on Eliquis yesterday for possible DVT. Pt is due to have his catheter removed tomorrow. Pt is now having blood in the urine that is draining from the catheter.

## 2017-10-06 DIAGNOSIS — C61 PROSTATE CANCER: Primary | ICD-10-CM

## 2017-10-09 ENCOUNTER — TELEPHONE (OUTPATIENT)
Dept: UROLOGY | Facility: CLINIC | Age: 67
End: 2017-10-09

## 2017-10-09 NOTE — TELEPHONE ENCOUNTER
----- Message from Tyrese Yanes MD sent at 10/6/2017  3:14 PM CDT -----  Vicki Mr ellison need appointment on November 2nd with RAYNA and Ramonita.

## 2017-10-23 ENCOUNTER — LAB VISIT (OUTPATIENT)
Dept: LAB | Facility: HOSPITAL | Age: 67
End: 2017-10-23
Attending: UROLOGY
Payer: MEDICARE

## 2017-10-23 DIAGNOSIS — R30.0 DYSURIA: ICD-10-CM

## 2017-10-23 DIAGNOSIS — R30.0 DYSURIA: Primary | ICD-10-CM

## 2017-10-23 PROCEDURE — 87086 URINE CULTURE/COLONY COUNT: CPT

## 2017-10-25 ENCOUNTER — TELEPHONE (OUTPATIENT)
Dept: UROLOGY | Facility: CLINIC | Age: 67
End: 2017-10-25

## 2017-10-25 ENCOUNTER — PATIENT MESSAGE (OUTPATIENT)
Dept: UROLOGY | Facility: CLINIC | Age: 67
End: 2017-10-25

## 2017-10-25 DIAGNOSIS — M62.58 PELVIC FLOOR MUSCLE WASTING IN MALE: Primary | ICD-10-CM

## 2017-10-25 DIAGNOSIS — N39.3 MALE URINARY STRESS INCONTINENCE: ICD-10-CM

## 2017-10-25 LAB — BACTERIA UR CULT: NORMAL

## 2017-10-25 RX ORDER — PHENAZOPYRIDINE HYDROCHLORIDE 200 MG/1
200 TABLET, FILM COATED ORAL 3 TIMES DAILY PRN
Qty: 50 TABLET | Refills: 1 | Status: SHIPPED | OUTPATIENT
Start: 2017-10-25 | End: 2017-11-24

## 2017-10-31 ENCOUNTER — CLINICAL SUPPORT (OUTPATIENT)
Dept: REHABILITATION | Facility: HOSPITAL | Age: 67
End: 2017-10-31
Attending: UROLOGY
Payer: MEDICARE

## 2017-10-31 DIAGNOSIS — N39.3 MALE URINARY STRESS INCONTINENCE: Primary | ICD-10-CM

## 2017-10-31 DIAGNOSIS — M62.58 PELVIC FLOOR MUSCLE WASTING IN MALE: ICD-10-CM

## 2017-10-31 PROCEDURE — 97110 THERAPEUTIC EXERCISES: CPT

## 2017-10-31 PROCEDURE — 97161 PT EVAL LOW COMPLEX 20 MIN: CPT

## 2017-10-31 PROCEDURE — G8990 OTHER PT/OT CURRENT STATUS: HCPCS | Mod: CL

## 2017-10-31 PROCEDURE — G8991 OTHER PT/OT GOAL STATUS: HCPCS | Mod: CK

## 2017-10-31 NOTE — PLAN OF CARE
OUTPATIENT PHYSICAL THERAPY EVALUATION        Patient: Ryder [facility] #:  [id number]    Date of treatment: 10/31/2017   Time in: 12:55  Time out: 1:50  Billable time: 55 minutes    HISTORY      Ryder is a 67 y.o. male evaluated on 10/31/2017    Physician:  Tyrese Yanes MD   Diagnosis:   Encounter Diagnoses   Name Primary?    Male urinary stress incontinence Yes    Pelvic floor muscle wasting in male       Treatment ordered: Physical Therapy  Medical History:   Past Medical History:   Diagnosis Date    Allergy     Hypertension     Prostate cancer       Surgical History:   Past Surgical History:   Procedure Laterality Date    corpal tunnel  2014    ELBOW ARTHROPLASTY  2012    HERNIA REPAIR      KNEE ARTHROSCOPY  2002    PROSTATECTOMY  09/2017    pelon yanes md - [facility]      Medications:   Current Outpatient Prescriptions   Medication Sig    acetaminophen (TYLENOL) 325 MG tablet Take 325 mg by mouth every 6 (six) hours as needed for Pain.    alprazolam (XANAX) 0.25 MG tablet Take 1 tablet (0.25 mg total) by mouth nightly as needed for Anxiety.    apixaban 5 mg Tab Take 1 tablet (5 mg total) by mouth 2 (two) times daily.    docusate sodium (COLACE) 250 MG capsule Take 250 mg by mouth once daily.    GREEN TEA EXTRACT ORAL Take by mouth.    losartan (COZAAR) 50 MG tablet Take 25 mg by mouth once daily. Patient takes 0.5 to 1 whole tablet at night    multivitamin capsule Take 1 capsule by mouth once daily.    nystatin (MYCOSTATIN) cream Apply topically 2 (two) times daily.    oxycodone-acetaminophen (PERCOCET) 5-325 mg per tablet Take 1 tablet by mouth every 4 (four) hours as needed for Pain.    phenazopyridine (PYRIDIUM) 200 MG tablet Take 1 tablet (200 mg total) by mouth 3 (three) times daily as needed for Pain.    polyethylene glycol (GLYCOLAX) 17 gram PwPk Take 17 g by mouth once daily.    sildenafil (VIAGRA) 50 MG tablet Take 50 mg by mouth daily as needed for  Erectile Dysfunction.     No current facility-administered medications for this visit.        Allergies:   Review of patient's allergies indicates:   Allergen Reactions    Coconut Hives    Pineapple Hives        Precautions: universal    Bladder/Bowel History: trouble emptying bladder completely and straining or pushing to empty bladder      SUBJECTIVE     Date of onset: Sept 2017  History of current complaint: pt reports that he is having to force himself to void- he does not get the urge to void much during the day, and is on a self-imposed schedule.  He tends to strain to void in efforts to empty and avoid leakage.  He reports nocturia x 2, and is dry at night.  He is not having issues with constipation.  Leaks with walking.    Patient's goals for therapy: to stop leaking urine during ADL's  Pain: Patient reports 0/10, with 0 being the lowest and 10 being the highest.    Activities that cause symptoms: changing positions (ex. sit to stand), with cough/sneeze/laugh/yell and walking    Previous treatment included none    Sexually active? No    Frequency of urination:   Daytime: every 30 minutes at times           Nighttime: 2    Difficulty initiating urine stream: Yes  Urine stream: weak and trickly, sometimes splayed  Complete emptying: No  Bladder leakage: Yes  Frequency of incidents: daily  Amount leaked (urine): drops    Frequency of bowel movements: 2 times a day  Difficulty initiating BM: No  Quality/Shape of BM: formed stool  Colon leakage: No  Form of protection: pad  Number of pads required in 24 hours: 25-30- changes very often due to fungal infection.        Types of fluid intake: water, milk and lemonade, diet coke zero; occ energy drink  Diet:no modifications  Current exercise:walks his property and does yard work.      Occupation: Pt is retired.  [facility] and [facility] [employer].      OBJECTIVE     ORTHO SCREEN  Posture: FHP; flattened lumbar lordosis  Pelvic alignment: no sign of deviations  noted in supine    ABDOMINALS  Scarring: portal sites healing- some sutures still present  Diastasis: 2 fingers above umbilicus  Abdominal strength: Rectus: 2/5     TA: minimally palpable, poorly isolated contraction  Chaperone: declined  Consent signed: Yes      RECTAL PELVIC FLOOR EXAM  EXTERNAL ASSESSMENT  Anus: WNL  Skin condition: WNL   Scarring: none  Sensation: WNL   Pain:  none  Voluntary contraction: visible lift  Voluntary relaxation:minimal visible drop  Involuntary contraction: visible lift  Bearing down: reflex tightening  Anal [place]: difficult to elicit  Discharge: none       INTERNAL ASSESSMENT  EAS tone: hypertonic   Impaction: none   Pain: none  Sensation: able to localize pressure appropriately   Muscle Bulk: hypertonus   Muscle Power: 2/5, R stronger than L      Quality of contraction: decreased hold and incomplete relaxation   Specificity: patient contracts: gluts  Coordination: tends to hold breath during PFM contraction     SEMG EVALUATION: Deferred due to time constraints    Functional Limitations Reports - G Codes  Category: other  Tool:  HARSH-6 Survey  Score: 67% Limitation   Current/ : CL = least 60% but < 80% impaired, limited or restricted  Goal/ : CK = at least 40% but < 60% impaired, limited or restricted       Modifier  Impairment Limitation Restriction    CH  0 % impaired, limited or restricted    CI  @ least 1% but less than 20% impaired, limited or restricted    CJ  @ least 20%<40% impaired, limited or restricted    CK  @ least 40%<60% impaired, limited or restricted    CL  @ least 60% <80% impaired, limited or restricted    CM  @ least 80%<100% impaired limited or restricted    CN  100% impaired, limited or restricted         TREATMENT      Therapeutic Exercise to develop  strength and endurance for 10 minutes including: diaphragmatic breathing and Kegels in supine or sitting.      Education: instructed on general anatomy/physiology of urinary/bowel system; discussed plan  of care with patient and parent/guardian; instructed in benefits/risks of treatment; instructed in alternative methods of treatment; instructed in risks of refusing treatment; patient a parent agreed to treatment plan.     Also educated in: bladder irritants, anatomy/physiology of pelvic floor, posture/body mechanices, behavior modifications and in the importance of muscle length.    ASSESSMENT      This is a 67 y.o. male referred to outpatient physical therapy and presents with a medical diagnosis of male urinary stress incontinence. Patient will benefit from skilled physical therapy to increase the strength of his pelvic floor muscles, and to maximize his urinary control for improved QOL..    Educational/Spiritual/Cultural needs: none  Abuse/Neglect: no signs  Nutritional Status: WDWN   Fall Risk: none    Pt's spiritual, cultural and educational needs considered and pt agreeable to plan of care and goals as stated below:     Medical necessity is demonstrated by the following IMPAIRMENTS/PROMBLEMS     History  Co-morbidities and personal factors that may impact the plan of care Examination  Body Structures and Functions, activity limitations and participation restrictions that may impact the plan of care Clinical Presentation   Decision Making/ Complexity Score   Co-morbidities:   S/p Cdiff and post op ileus              Personal Factors:    Body Regions/Systems/Functions:    poor knowledge of body mechanics and posture, decreased pelvic muscle strength, increased tension of the pelvic muscles and poor coordination of pelvic floor muscles during ADL's leading to urinary or fecal leakage           Activity limitations:   Barriers to Learning: none  Environmental Barriers: none noted    Participation Restrictions:   Outdoor activities on his property, and ADL's limited by urinary leakage.         Stable and uncomplicated low           PLAN    Frequency: once per 2 weeks  Duration: 12 weeks    Long Term Goals: 12  weeks   Pt will report improved ability to perform ADLs (ie. dressing, bathing, functional transfers) with little (drops) to no urinary leakage 7/7 days per week.  Pt will report improved ability to perform iADLs (ie. driving, home chores, grocery shopping) with little (drops) to no urinary leakage 7/7 days per week.   Pt will report improved ability to perform advanced iADLs (ie. gardening, yard work, heavy lifting, moving furniture) with little (drops) to no urinary leakage 7/7 days per week.   Pt will report improved ability to cough/sneeze/laugh/yell with little (drops) to no urinary leakage 7/7 days per week.   Pt will report a decrease in pad use to 3 pads per day.  Pt will urinate without crede/Valsalva to prevent adverse effects to adjacent structures.  Pt/family will be independent with HEP for continued self-management of symptoms.    Physical therapy will include: therapeutic exercises, neuromuscular re-education and patient/family education      Therapist: Jazzy Pizarro, PT, BCB-PMD  10/31/2017

## 2017-10-31 NOTE — PATIENT INSTRUCTIONS
Home Program: 10/31/2017    Kegels in sitting or lyin. Sit or lie down with legs and buttocks relaxed.  2. Squeeze and LIFT the muscles that stop the flow of urine and passage of gas.    3. Hold for 5 sec without holding breath.  4. Release and DROP for 10 sec.  5. Repeat 10 times, 3 times per day.      Do 2 minutes of belly breathing after each set of Kegels.  (this will lengthen the pelvic floor)    When trying to void, instead of straining, try BELLY BREATHING (breathe in and let your belly expand, breathe out and let it recoil.

## 2017-11-01 ENCOUNTER — LAB VISIT (OUTPATIENT)
Dept: LAB | Facility: HOSPITAL | Age: 67
End: 2017-11-01
Attending: UROLOGY
Payer: MEDICARE

## 2017-11-01 DIAGNOSIS — C61 PROSTATE CANCER: ICD-10-CM

## 2017-11-01 LAB — COMPLEXED PSA SERPL-MCNC: 0.02 NG/ML

## 2017-11-01 PROCEDURE — 36415 COLL VENOUS BLD VENIPUNCTURE: CPT | Mod: PO

## 2017-11-01 PROCEDURE — 84153 ASSAY OF PSA TOTAL: CPT

## 2017-11-02 ENCOUNTER — OFFICE VISIT (OUTPATIENT)
Dept: UROLOGY | Facility: CLINIC | Age: 67
End: 2017-11-02
Payer: MEDICARE

## 2017-11-02 VITALS
DIASTOLIC BLOOD PRESSURE: 78 MMHG | HEIGHT: 71 IN | BODY MASS INDEX: 30.65 KG/M2 | HEART RATE: 73 BPM | SYSTOLIC BLOOD PRESSURE: 116 MMHG | WEIGHT: 218.94 LBS

## 2017-11-02 VITALS
WEIGHT: 218.06 LBS | BODY MASS INDEX: 30.53 KG/M2 | DIASTOLIC BLOOD PRESSURE: 71 MMHG | HEIGHT: 71 IN | HEART RATE: 79 BPM | SYSTOLIC BLOOD PRESSURE: 122 MMHG | RESPIRATION RATE: 16 BRPM

## 2017-11-02 DIAGNOSIS — C61 PROSTATE CANCER: ICD-10-CM

## 2017-11-02 DIAGNOSIS — I10 ESSENTIAL HYPERTENSION: ICD-10-CM

## 2017-11-02 DIAGNOSIS — C61 PROSTATE CANCER: Primary | ICD-10-CM

## 2017-11-02 DIAGNOSIS — N52.31 ERECTILE DYSFUNCTION FOLLOWING RADICAL PROSTATECTOMY: Primary | ICD-10-CM

## 2017-11-02 PROCEDURE — 99214 OFFICE O/P EST MOD 30 MIN: CPT | Mod: S$PBB,24,, | Performed by: UROLOGY

## 2017-11-02 PROCEDURE — 99213 OFFICE O/P EST LOW 20 MIN: CPT | Mod: PBBFAC,27 | Performed by: UROLOGY

## 2017-11-02 PROCEDURE — 99024 POSTOP FOLLOW-UP VISIT: CPT | Mod: S$PBB,,, | Performed by: UROLOGY

## 2017-11-02 PROCEDURE — 99999 PR PBB SHADOW E&M-EST. PATIENT-LVL III: CPT | Mod: PBBFAC,,, | Performed by: UROLOGY

## 2017-11-02 PROCEDURE — 99213 OFFICE O/P EST LOW 20 MIN: CPT | Mod: PBBFAC | Performed by: UROLOGY

## 2017-11-02 RX ORDER — TADALAFIL 5 MG/1
TABLET ORAL
Qty: 30 TABLET | Refills: 11 | Status: SHIPPED | OUTPATIENT
Start: 2017-11-02 | End: 2018-03-01

## 2017-11-02 NOTE — LETTER
November 2, 2017      Tyrese Yanes MD  [address]  [facility] [address]           [facility] - Urology 4th Floor  [address]  [facility] [address]  Phone: [phone]          Patient: Ryder Senior   MR Number: [id number]   YOB: 1950   Date of Visit: 11/2/2017       Dear Dr. Tyrese Yanes:    Thank you for referring Ryder Senior to me for evaluation. Attached you will find relevant portions of my assessment and plan of care.    If you have questions, please do not hesitate to call me. I look forward to following Ryder Senior along with you.    Sincerely,    Andrez Shipman MD    Enclosure  CC:  No Recipients    If you would like to receive this communication electronically, please contact [email or web address] or [phone] to request more information on [employer] Link access.    For providers and/or their staff who would like to refer a patient to [facility], please contact us through our one-stop-shop provider referral line, [facility], at [phone].    If you feel you have received this communication in error or would no longer like to receive these types of communications, please e-mail [email or web address]

## 2017-11-02 NOTE — PATIENT INSTRUCTIONS
Oral Medications for Erectile Dysfunction  Prescription oral medications can be used for ED. They often work well. But, like all medications, they can have side effects. Also, they cant be used if a man has certain health problems or takes certain other medications. Talk with your doctor about oral ED medication. Ask whether it is right for you.  Types of Oral ED Medications  There are three types of prescription oral ED medications. Each one increases blood flow to the penis. When the penis is stimulated, an erection results. The three types are:  · Sildenafil citrate (Viagra)  · Tadalafil (Cialis)  · Vardenafil HCl (Levitra)  What Oral ED Medications Dont Do  There are some things ED medications cant do.  · They dont cure the cause of your ED. Without the medication, youll still have trouble getting an erection.  · They cant produce an erection without sexual stimulation.  · They wont increase sexual desire. They also wont solve any other sexual issues. Psychological, emotional, or relationship issues will not be fixed.  Taking Oral ED Medications Safely  · Do not combine ED medications with other treatments unless your doctor tells you to.  · Dont take ED medications more often or in larger doses than prescribed.  · Tell your doctor your health history. Mention all medications you take. This includes over-the-counter drugs, supplements, and herbs.  · Ask your doctor about whether you can drink alcohol while taking ED medication.  Possible Side Effects of Oral ED Medications  · Headache  · Facial flushing  · Runny or stuffy nose  · Indigestion  · Distortion of your color vision for a short time  · Sudden vision loss or hearing loss (rare)  Risks of Oral ED Medications   · Do not take ED medications if you take medications containing nitrates. The combination may drop your blood pressure to a dangerous level. Nitrates include nitroglycerin (a drug for angina). They are also in poppers, an inhaled  recreational drug. If youre not sure whether youre taking nitrates, ask your doctor or pharmacist.  · Medications called alpha-blockers can interact with ED medications. They can cause a sudden drop in blood pressure. Alpha-blockers are a common treatment for prostate problems. They also treat high blood pressure. Be sure your doctor knows if you take these medications.  · If youve had a heart attack or have heart disease and you have not had sex for a while, talk to your doctor. Having sex again can put extra strain on your heart. Your doctor can confirm that your heart is healthy enough for sex.  · It is rare, but some men taking ED medications have had sudden vision loss. This may be more likely if other health problems are present. These include high blood pressure and diabetes. Ask your doctor if you are at risk for this type of vision loss.  · In rare cases, an erection may last too long. This can badly damage the blood vessels in your penis. If an erection lasts longer than 4 hours, go to the emergency room right away.       © 9155-5372 Bern[employer] Nap[employer], [address] [place] [address], [place] [address]. All rights reserved. This information is not intended as a substitute for professional medical care. Always follow your healthcare professional's instructions.

## 2017-11-02 NOTE — PROGRESS NOTES
Mr. Senior is a 67 y.o. male presenting for a consultation at the request of Dr. Yanes. Patient presents with ED.    PRESENTING ILLNESS:    Ryder Senior is a 67 y.o. male s/p RALP on 9/25/2017 by Dr. Yanes.  Since then, he c/o ED.  Prior to surgery, he did have ED.  He got good results with Viagra.  His ED has been present for > 1 year.    He does have TIMBO.  He is wearing 15 pads/day.  No hematuria.  No dysuria.  Good FOS.    No bone pain or weight loss.    REVIEW OF SYSTEMS:    Ryder Senior denies any history of headache, blurred vision, fever, nausea, vomiting, chills, flank discomfort, abdominal pain, bleeding per rectum, cough, SOB, recent loss of consciousness, recent mental status changes, seizures, dizziness, or upper or lower extremity weakness.    NOE  1. 1  2. 2  3. 2  4. 2  5. 3      PATIENT HISTORY:    Past Medical History:   Diagnosis Date    Allergy     Hypertension     Prostate cancer        Family History   Problem Relation Age of Onset    Prostate cancer Father     Alzheimer's disease Father     Cancer Mother     Prostate cancer Maternal Grandfather        Past Surgical History:   Procedure Laterality Date    corpal tunnel  2014    ELBOW ARTHROPLASTY  2012    HERNIA REPAIR      KNEE ARTHROSCOPY  2002    PROSTATECTOMY  09/2017    pelon yanes md - [facility]       Social History     Social History    Marital status:      Spouse name: N/A    Number of children: N/A    Years of education: N/A     Social History Main Topics    Smoking status: Never Smoker    Smokeless tobacco: Never Used    Alcohol use 1.2 oz/week     2 Cans of beer per week    Drug use: No    Sexual activity: Not Asked     Other Topics Concern    None     Social History Narrative    None       Review of patient's allergies indicates:   Allergen Reactions    Coconut Hives    Pineapple Hives         Current Outpatient Prescriptions:     acetaminophen (TYLENOL) 325 MG tablet, Take 325 mg by  mouth every 6 (six) hours as needed for Pain., Disp: , Rfl:     alprazolam (XANAX) 0.25 MG tablet, Take 1 tablet (0.25 mg total) by mouth nightly as needed for Anxiety., Disp: 30 tablet, Rfl: 1    apixaban 5 mg Tab, Take 1 tablet (5 mg total) by mouth 2 (two) times daily., Disp: 60 tablet, Rfl: 0    docusate sodium (COLACE) 250 MG capsule, Take 250 mg by mouth once daily., Disp: , Rfl:     GREEN TEA EXTRACT ORAL, Take by mouth., Disp: , Rfl:     losartan (COZAAR) 50 MG tablet, Take 25 mg by mouth once daily. Patient takes 0.5 to 1 whole tablet at night, Disp: , Rfl:     multivitamin capsule, Take 1 capsule by mouth once daily., Disp: , Rfl:     nystatin (MYCOSTATIN) cream, Apply topically 2 (two) times daily., Disp: 15 g, Rfl: 1    oxycodone-acetaminophen (PERCOCET) 5-325 mg per tablet, Take 1 tablet by mouth every 4 (four) hours as needed for Pain., Disp: 41 tablet, Rfl: 0    phenazopyridine (PYRIDIUM) 200 MG tablet, Take 1 tablet (200 mg total) by mouth 3 (three) times daily as needed for Pain., Disp: 50 tablet, Rfl: 1    polyethylene glycol (GLYCOLAX) 17 gram PwPk, Take 17 g by mouth once daily., Disp: 30 packet, Rfl: 0    sildenafil (VIAGRA) 50 MG tablet, Take 50 mg by mouth daily as needed for Erectile Dysfunction., Disp: , Rfl:       PHYSICAL EXAMINATION:    The patient generally appears in good health, is appropriately interactive, and is in no apparent distress.     Eyes: anicteric sclerae, moist conjunctivae; no lid-lag; PERRLA     HENT: Atraumatic; oropharynx clear with moist mucous membranes and no mucosal ulcerations;normal hard and soft palate. No evidence of lymphadenopathy.    Neck: Trachea midline.  No thyromegaly.    Musculoskeletal: No abnormal gait.    Skin: No lesions.    Mental: Cooperative with normal affect.  Is oriented to time, place, and person.    Neuro: Grossly intact.    Chest: Normal inspiratory effort.   No accessory muscles.  No audible wheezes.  Respirations symmetric on  inspiration and expiration.    Heart: Regular rhythm.      Abdomen:  Soft, non-tender. No masses or organomegaly. Bladder is not palpable. No evidence of flank discomfort. No evidence of inguinal hernia.    Genitourinary: The penis is circumcised with no evidence of plaques or induration. The urethral meatus is normal. The testes, epididymides, and cord structures are normal in size and contour bilaterally. The scrotum is normal in size and contour.    Extremities: No clubbing, cyanosis, or edema        LABS:      Lab Results   Component Value Date    PSADIAG 0.02 11/01/2017        IMPRESSION:    Encounter Diagnoses   Name Primary?    Erectile dysfunction following radical prostatectomy Yes    Prostate cancer     Essential hypertension    HTN, controlled    PLAN:    1. Discussed penile rehab today.  The risks, benefits, and the evidence was discussed with him today.  We discussed different options.  He would like to try Cialis.  Side effects discussed.  A new Rx was given.   2. RTC 2 months to re-evaluate.    Copy to: Joaquín

## 2017-11-02 NOTE — PROGRESS NOTES
Clinic Note  2017      Subjective:         Chief Complaint:   HPI  Ryder Senior is a 67 y.o. male s/p robotic assisted laparoscopic prostatectomy 2017.  Had a complicated postop course. Admitted with ileus to [facility], treated with NG and antibiotics. Subsequently developed DVT and C Diff.  Dry at night, sitting, leaks. Working with Jazzy every 2 weeks. Doing Kegels. 15-20 pads/ day.    Path:  Microscopic Examination  Prostate: Radical prostatectomy with lymph node excisions  Specimen size:  Weight: 50 g.  Size: 5 x 3.5 x 3.2 cm.  Histologic type:Acinar adenocarcinoma  Primary Gordo Pattern: 3  Secondary Gordo Pattern: 2  Total Luis Score: 5  Grade Group: 1  Tumor quantitation: There is only a modest total volume of adenocarcinoma present. The carcinoma is present in the  left base, the right and left anterior regions, and the apical right posterior region with a minute amount in the right  posterior basilar region. The longest length of carcinoma in a slide is 1.6 cm area  Extraprostatic extension: Not identified  Urinary Bladder Neck Invasion: Not identified  Seminal vesicle invasion: Not identified  Margin: No carcinoma identified actually with in the intima margin. The carcinoma does extend immediately adjacent  to the margin in the left basilar area with less than the cells  the carcinoma from the margin.  Treatment effect of carcinoma: Not identified  Lymph node samplin/possibly as many as 11  Pathologic stage (AJCC 2010): Pt2 Pn0 Pmx      Lab Results   Component Value Date    PSADIAG 0.02 2017      Past Medical History:   Diagnosis Date    Allergy     Hypertension     Prostate cancer      Family History   Problem Relation Age of Onset    Prostate cancer Father     Alzheimer's disease Father     Cancer Mother     Prostate cancer Maternal Grandfather      Social History     Social History    Marital status:      Spouse name: N/A    Number of children:  "N/A    Years of education: N/A     Occupational History    Not on file.     Social History Main Topics    Smoking status: Never Smoker    Smokeless tobacco: Never Used    Alcohol use 1.2 oz/week     2 Cans of beer per week    Drug use: No    Sexual activity: Not on file     Other Topics Concern    Not on file     Social History Narrative    No narrative on file     Past Surgical History:   Procedure Laterality Date    corpal tunnel  2014    ELBOW ARTHROPLASTY  2012    HERNIA REPAIR      KNEE ARTHROSCOPY  2002    PROSTATECTOMY  09/2017    pelon hidalgo md - [facility]     Patient Active Problem List   Diagnosis    Prostate cancer    Hypertension    Abdominal distention    C. difficile colitis    Acute deep vein thrombosis (DVT) of right lower extremity    Pelvic floor muscle wasting in male    Male urinary stress incontinence     Review of Systems   Constitutional: Negative for appetite change, chills, fatigue, fever and unexpected weight change.   HENT: Negative for nosebleeds.    Respiratory: Negative for shortness of breath and wheezing.    Cardiovascular: Negative for chest pain, palpitations and leg swelling.   Gastrointestinal: Negative for abdominal distention, abdominal pain, constipation, diarrhea, nausea and vomiting.   Genitourinary: Positive for dysuria. Negative for hematuria.   Musculoskeletal: Negative for arthralgias and back pain.   Skin: Negative for pallor.   Neurological: Negative for dizziness, seizures and syncope.   Hematological: Negative for adenopathy.   Psychiatric/Behavioral: Negative for dysphoric mood.         Objective:      Resp 16   Ht 5' 11" (1.803 m)   Wt 98.9 kg (218 lb 0.6 oz)   BMI 30.41 kg/m²   Estimated body mass index is 30.41 kg/m² as calculated from the following:    Height as of this encounter: 5' 11" (1.803 m).    Weight as of this encounter: 98.9 kg (218 lb 0.6 oz).  Physical Exam   Constitutional: He is oriented to person, place, and time. " He appears well-developed and well-nourished. No distress.   HENT:   Head: Atraumatic.   Neck: No tracheal deviation present.   Cardiovascular: Normal rate.    Pulmonary/Chest: Effort normal. No respiratory distress. He has no wheezes.   Abdominal: Soft. Bowel sounds are normal. He exhibits no distension and no mass. There is no tenderness. There is no rebound and no guarding.   Incisions healing well   Neurological: He is alert and oriented to person, place, and time.   Skin: Skin is warm and dry. He is not diaphoretic.     Psychiatric: He has a normal mood and affect. His behavior is normal. Judgment and thought content normal.         Assessment and Plan:           Problem List Items Addressed This Visit     Prostate cancer - Primary          Follow up:   Resume full activity. 4 months with PSA.    Tyrese Yanes

## 2017-11-03 ENCOUNTER — PATIENT MESSAGE (OUTPATIENT)
Dept: UROLOGY | Facility: CLINIC | Age: 67
End: 2017-11-03

## 2017-11-14 ENCOUNTER — CLINICAL SUPPORT (OUTPATIENT)
Dept: REHABILITATION | Facility: HOSPITAL | Age: 67
End: 2017-11-14
Payer: MEDICARE

## 2017-11-14 DIAGNOSIS — M62.58 PELVIC FLOOR MUSCLE WASTING IN MALE: ICD-10-CM

## 2017-11-14 DIAGNOSIS — N39.3 MALE URINARY STRESS INCONTINENCE: ICD-10-CM

## 2017-11-14 PROCEDURE — 97110 THERAPEUTIC EXERCISES: CPT

## 2017-11-14 NOTE — PATIENT INSTRUCTIONS
Home Program: 10/31/2017    Kegels in sitting or lyin. Sit or lie down with legs and buttocks relaxed.  2. Squeeze and LIFT the muscles that stop the flow of urine and passage of gas.    3. Hold for 10 sec without holding breath.  4. Release and DROP for 10 sec.  5. Repeat 10 times, 2 sets, 2 times per day.      Do 2 minutes of belly breathing after each set of Kegels.  (this will lengthen the pelvic floor)    Add: abdominal sets:  1. In any position, draw in your lower belly as if zipping tight pants.    2. Hold for 5 sec without holding breath.  3. Release for 10 sec.  4. Repeat 10 times, 1 set, twice per day.      When trying to void, instead of straining, try BELLY BREATHING (breathe in and let your belly expand, breathe out and let it recoil.

## 2017-11-14 NOTE — PROGRESS NOTES
OUTPATIENT PHYSICAL THERAPY DAILY NOTE       [id number]    Diagnosis:   Encounter Diagnoses   Name Primary?    Pelvic floor muscle wasting in male     Male urinary stress incontinence       Date of treatment: 11/14/2017     Time in: 1:00  Time out: 1:40  Billable time: 40 minutes  Visit #: 2      SUBJECTIVE   Pt reports: no significant changes or complaints.  He reports that he feels the urge to urinate when sitting or lying down, but not while standing and moving around.    Pain: 0/10 on VAS scale  Pain location: NA    OBJECTIVE     Therapeutic Exercise to develop  strength and endurance for 40 minutes including: Kegels with assist of SEMG and abdominal sets.  We worked in supine and sitting with external lead wires.  He demonstrated normal baseline resting, low WR rise, and good holding in 10 sec Kegels.  He required only minimal cues to refrain from breath holding.  In TA sets, he demonstrated good overflow onto the pelvic floor, and required verbal cues not to alter his breathing pattern.  WR rise in seated Kegels was slightly lower.  Please refer to pt. Instructions for revised home program.     Education: Discussed progression of plan of care with patient; educated pt in activity modification; reviewed HEP with pt. Pt demonstrated and verbalized understanding of all instruction and was provided with a handout of HEP (see Patient Instructions).      ASSESSMENT      Pt tolerated entire treatment well with no visible adverse effects. Will continue to watch for poor use of muscle length- may need to introduce yoga.  Will the patient continue to benefit from skilled PT intervention? Yes  Medical necessity demonstrated by: skilled PT supervision needed for HEP and treatment progression.    Progress towards goals:  satisfactory  New/Revised goals: none      PLAN     Continue with established Plan of Care, working toward established PT goals.

## 2017-11-28 ENCOUNTER — CLINICAL SUPPORT (OUTPATIENT)
Dept: REHABILITATION | Facility: HOSPITAL | Age: 67
End: 2017-11-28
Attending: FAMILY MEDICINE
Payer: MEDICARE

## 2017-11-28 DIAGNOSIS — M62.58 PELVIC FLOOR MUSCLE WASTING IN MALE: ICD-10-CM

## 2017-11-28 DIAGNOSIS — N39.3 MALE URINARY STRESS INCONTINENCE: ICD-10-CM

## 2017-11-28 PROCEDURE — 97110 THERAPEUTIC EXERCISES: CPT

## 2017-11-28 NOTE — PROGRESS NOTES
OUTPATIENT PHYSICAL THERAPY DAILY NOTE       [id number]    Diagnosis:   Encounter Diagnoses   Name Primary?    Pelvic floor muscle wasting in male     Male urinary stress incontinence       Date of treatment: 11/28/2017     Time in: 2:34  Time out: 3:26  Billable time: 45 minutes  Visit #: 3      SUBJECTIVE   Pt reports: no significant changes or complaints.  He reports that he feels the urge to urinate when sitting or lying down, but not while standing and moving around.  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  We spoke about lifting and avoiding breath holding during lifting activities.    Pain: 0/10 on VAS scale  Pain location: NA    OBJECTIVE     Therapeutic Exercise to develop  strength and endurance for 40 minutes including: Kegels with assist of SEMG.  We worked in supine, sitting, and standing with external lead wires.  He demonstrated normal baseline resting, improved WR rise, and good holding in 10 sec Kegels. He struggled to refrain from breath holding, and to eliminate accessory muscles.  In standing Kegels, he required verbal cues to maintain optimal trunk/pelvic alignment (had trouble straightening his R knee)  Please refer to pt. Instructions for revised home program.     Education: Discussed progression of plan of care with patient; educated pt in activity modification; reviewed HEP with pt. Pt demonstrated and verbalized understanding of all instruction and was provided with a handout of HEP (see Patient Instructions).      ASSESSMENT      Pt tolerated entire treatment well with no visible adverse effects. He struggles to relax and isolate- hopefully introducing motion will allow him to breathe more easily during Kegels.  Will the patient continue to benefit from skilled PT intervention? Yes  Medical necessity demonstrated by: skilled PT supervision needed for HEP and treatment progression.    Progress towards goals:   satisfactory  New/Revised goals: none      PLAN     Continue with established Plan of Care, working toward established PT goals.

## 2017-12-26 ENCOUNTER — CLINICAL SUPPORT (OUTPATIENT)
Dept: REHABILITATION | Facility: HOSPITAL | Age: 67
End: 2017-12-26
Payer: MEDICARE

## 2017-12-26 DIAGNOSIS — M62.58 PELVIC FLOOR MUSCLE WASTING IN MALE: ICD-10-CM

## 2017-12-26 DIAGNOSIS — N39.3 MALE URINARY STRESS INCONTINENCE: ICD-10-CM

## 2017-12-26 PROCEDURE — 97110 THERAPEUTIC EXERCISES: CPT

## 2017-12-26 NOTE — PATIENT INSTRUCTIONS
Home Program: 2017    Kegels in standin. Stand with legs and buttocks relaxed.  2. Squeeze and LIFT the muscles that stop the flow of urine and passage of gas.    3. Hold for 10 sec without holding breath.  4. Release and DROP for 10 sec.  5. Repeat 10 times, 2 sets, 2 times per day.      Do the above while walking and talking sometimes.  Also, try standing with one leg on a stool or step.      Do 2 minutes of belly breathing after each set of Kegels.  (this will lengthen the pelvic floor)      When trying to void, instead of straining, try BELLY BREATHING (breathe in and let your belly expand, breathe out and let it recoil.        INSTRUCTIONS FOR CONTROLLING URINARY/FECAL URGE      WHEN YOU EXPERIENCE A STRONG URGE TO URINATE:    FIRST Stop activity, stand quietly or sit down. Try to stay very still to maintain control. Avoid rushing to the toilet.    SECOND Begin Quick Flicks (1 second LIFT of pelvic floor muscles, 4 second DROP). Pelvic floor contractions send a message to the bladder to relax and hold urine.     THIRD Relax. Do not rush to the toilet. Take a deep belly or diaphragmatic breath and let it out slowly. Let the urge to urinate pass by using distraction techniques and positive thoughts. Try not to think about going to the bathroom.    FINALLY When you feel the urge subside, walk normally to the bathroom. You can urinate once the urge has subsided.          Urge feeling!      Stop and be still. Do NOT kim to   Think positively.         Begin Quick Flicks.      the toilet.  Distract yourself.

## 2017-12-26 NOTE — PROGRESS NOTES
OUTPATIENT PHYSICAL THERAPY DAILY NOTE       [id number]    Diagnosis:   Encounter Diagnoses   Name Primary?    Pelvic floor muscle wasting in male     Male urinary stress incontinence       Date of treatment: 12/26/2017     Time in: 2:01  Time out: 2:42  Billable time: 40 minutes  Visit #: 4      SUBJECTIVE   Pt reports: voiding to urge every 2 hours.  He leaks with urge to void, cough/sneeze, and exertion.  Nocturia x 1-2, no leakage.  2-3 pads per day (3-5 to be clean, using thinner pads).  Has been doing Kegels while he walks.      Pain: 0/10 on VAS scale  Pain location: NA    OBJECTIVE     Therapeutic Exercise to develop strength and endurance for 40 minutes including: Kegels with assist of SEMG.  We worked in sitting and standing with external lead wires.  He demonstrated normal baseline resting, good WR rise, and good holding in 10 sec Kegels. (WR rise not much different in the seated position, compared with last session).  He demonstrated improved ability to maintain lift and breathe at the same time.   In standing Kegels, his resting activity and WR rise were improved from last session.  We then looked at QF, and we discussed urge control strategies.   Please refer to pt. Instructions for revised home program.     Education: Discussed progression of plan of care with patient; educated pt in activity modification; reviewed HEP with pt. Pt demonstrated and verbalized understanding of all instruction and was provided with a handout of HEP (see Patient Instructions).      ASSESSMENT      Pt tolerated entire treatment well with no visible adverse effects. He is doing better overall.  Next visit may be last.    Will the patient continue to benefit from skilled PT intervention? Yes  Medical necessity demonstrated by: skilled PT supervision needed for HEP and treatment progression.    Progress towards goals:  satisfactory  New/Revised goals: none      PLAN     Continue with  established Plan of Care, working toward established PT goals.

## 2018-01-04 ENCOUNTER — OFFICE VISIT (OUTPATIENT)
Dept: UROLOGY | Facility: CLINIC | Age: 68
End: 2018-01-04
Payer: MEDICARE

## 2018-01-04 VITALS
HEART RATE: 74 BPM | DIASTOLIC BLOOD PRESSURE: 88 MMHG | HEIGHT: 71 IN | BODY MASS INDEX: 31.36 KG/M2 | SYSTOLIC BLOOD PRESSURE: 145 MMHG | WEIGHT: 224 LBS

## 2018-01-04 DIAGNOSIS — N52.31 ERECTILE DYSFUNCTION FOLLOWING RADICAL PROSTATECTOMY: Primary | ICD-10-CM

## 2018-01-04 DIAGNOSIS — N39.3 MALE URINARY STRESS INCONTINENCE: ICD-10-CM

## 2018-01-04 DIAGNOSIS — C61 PROSTATE CANCER: ICD-10-CM

## 2018-01-04 DIAGNOSIS — I10 ESSENTIAL HYPERTENSION: ICD-10-CM

## 2018-01-04 PROBLEM — A04.72 C. DIFFICILE COLITIS: Status: RESOLVED | Noted: 2017-10-02 | Resolved: 2018-01-04

## 2018-01-04 PROCEDURE — 99214 OFFICE O/P EST MOD 30 MIN: CPT | Mod: S$PBB,,, | Performed by: UROLOGY

## 2018-01-04 PROCEDURE — 99213 OFFICE O/P EST LOW 20 MIN: CPT | Mod: PBBFAC | Performed by: UROLOGY

## 2018-01-04 PROCEDURE — 99999 PR PBB SHADOW E&M-EST. PATIENT-LVL III: CPT | Mod: PBBFAC,,, | Performed by: UROLOGY

## 2018-01-04 RX ORDER — PAPAVERINE HYDROCHLORIDE 30 MG/ML
INJECTION INTRAMUSCULAR; INTRAVENOUS
Qty: 5 ML | Refills: 0 | Status: SHIPPED | OUTPATIENT
Start: 2018-01-04 | End: 2018-01-15 | Stop reason: SDUPTHER

## 2018-01-04 NOTE — PATIENT INSTRUCTIONS
Penile Self-Injection Procedure  Self-injection is a good option for many men with erectile dysfunction (ED). A tiny needle is used to inject medication into the penis. This helps your penis get hard and stay that way long enough for sex. And sex and orgasm will feel as good as always. You may be nervous about doing self-injection at first. But with practice, it will get easier. Your healthcare provider will show you how to do self-injection the first time. The simple steps are outlined on this sheet.  Preparing for Injection  · Wash your hands well with soap and water.  · Prepare the medication (if needed).  · Sit or  a comfortable position in a warm, well-lit room. If you need to, sit or  front of a mirror.  · Find an injection site on one side of your penis, in a place with no visible veins. (Dont inject into the top, bottom, or head of the penis.)  · Clean the injection site with an alcohol swab. Grasp the head of your penis firmly with your thumb and forefinger (dont just pinch the skin). Stretch the penis so the skin on the shaft is taut.  Injecting the Medication  · Rest your penis against your inner thigh and pull it gently toward your knee. Dont twist or rotate it. This way youll be sure to inject the medication into the spot you chose and cleaned before.  · Hold the syringe between your thumb and fingers, like youre holding a pen. Rest your forearm on your thigh for support.  · Insert the needle at a 90-degree angle (perpendicular) to the shaft. Do this quickly to reduce discomfort. (The needle should go in easily. If it doesnt, stop right away.)  · Move your thumb to the plunger. Press down to inject the medication, counting to 5.  · Remove the needle and dispose of it safely.     The injection site can be in any part of the shaded area.     Insert the needle straight into the penis.    Gaining an Erection  · Apply pressure to the injection site for a few minutes. This prevents  swelling and bruising and helps spread the medication.   · Stand up. This may help your erection develop. Foreplay often helps, too.  · Your penis should become firm within 10-20 minutes. The erection will last long enough for sex, and maybe longer.  Call Your Doctor If You Have:   · An erection that lasts longer than 3-4  hours  · Bleeding or bruising  · Severe pain  · Scarring or curvature of the penis       © 7041-7740 [facility], [address] [place] [address], [place] [address]. All rights reserved. This information is not intended as a substitute for professional medical care. Always follow your healthcare professional's instructions.

## 2018-01-04 NOTE — PROGRESS NOTES
Mr. Senior is a 67 y.o. male presenting with ED.    PRESENTING ILLNESS:    Ryder Senior is a 67 y.o. male s/p RALP on 9/25/2017 by Dr. Yanes.  Since then, he c/o ED.  Prior to surgery, he did have ED.  He got good results with Viagra.  His ED has been present for > 1 year.  He's been using Cialis QOD for rehab.  He hasn't gotten an erection.    He does have minimal TIMBO.  He is wearing 1 pads/day.  No hematuria.  No dysuria.  Good FOS.    No bone pain or weight loss.    REVIEW OF SYSTEMS:    Ryder Senior denies headache, blurred vision, fever, nausea, vomiting, chills, flank discomfort, abdominal pain, bleeding per rectum, cough, SOB, recent loss of consciousness, recent mental status changes, seizures, dizziness, or upper or lower extremity weakness.    NOE  1. 1  2. 0  3. 0  4. 0  5. 0       PATIENT HISTORY:    Past Medical History:   Diagnosis Date    Allergy     Hypertension     Prostate cancer        Family History   Problem Relation Age of Onset    Prostate cancer Father     Alzheimer's disease Father     Cancer Mother     Prostate cancer Maternal Grandfather        Past Surgical History:   Procedure Laterality Date    corpal tunnel  2014    ELBOW ARTHROPLASTY  2012    HERNIA REPAIR      KNEE ARTHROSCOPY  2002    PROSTATECTOMY  09/2017    pelon yanes md - [facility]       Social History     Social History    Marital status:      Spouse name: N/A    Number of children: N/A    Years of education: N/A     Social History Main Topics    Smoking status: Never Smoker    Smokeless tobacco: Never Used    Alcohol use 1.2 oz/week     2 Cans of beer per week    Drug use: No    Sexual activity: Not Asked     Other Topics Concern    None     Social History Narrative    None       Review of patient's allergies indicates:   Allergen Reactions    Coconut Hives    Pineapple Hives         Current Outpatient Prescriptions:     acetaminophen (TYLENOL) 325 MG tablet, Take 325 mg by  mouth every 6 (six) hours as needed for Pain., Disp: , Rfl:     alprazolam (XANAX) 0.25 MG tablet, Take 1 tablet (0.25 mg total) by mouth nightly as needed for Anxiety., Disp: 30 tablet, Rfl: 1    docusate sodium (COLACE) 250 MG capsule, Take 250 mg by mouth once daily., Disp: , Rfl:     GREEN TEA EXTRACT ORAL, Take by mouth., Disp: , Rfl:     losartan (COZAAR) 50 MG tablet, Take 25 mg by mouth once daily. Patient takes 0.5 to 1 whole tablet at night, Disp: , Rfl:     multivitamin capsule, Take 1 capsule by mouth once daily., Disp: , Rfl:     sildenafil (VIAGRA) 50 MG tablet, Take 50 mg by mouth daily as needed for Erectile Dysfunction., Disp: , Rfl:     tadalafil (CIALIS) 5 MG tablet, Take 1 PO QOD, Disp: 30 tablet, Rfl: 11    papaverine 30 mg/mL injection, Add Phentolamine 1 mg/cc Add PGE1 10 mcg/cc  SIG: Bring to office for test dose in physician office, Disp: 5 mL, Rfl: 0      PHYSICAL EXAMINATION:    The patient generally appears in good health, is appropriately interactive, and is in no apparent distress.     Eyes: anicteric sclerae, moist conjunctivae; no lid-lag; PERRLA     HENT: Atraumatic; oropharynx clear with moist mucous membranes and no mucosal ulcerations;normal hard and soft palate. No evidence of lymphadenopathy.    Neck: Trachea midline.  No thyromegaly.    Musculoskeletal: No abnormal gait.    Skin: No lesions.    Mental: Cooperative with normal affect.  Is oriented to time, place, and person.    Neuro: Grossly intact.    Chest: Normal inspiratory effort.   No accessory muscles.  No audible wheezes.  Respirations symmetric on inspiration and expiration.    Heart: Regular rhythm.      Abdomen:  Soft, non-tender. No masses or organomegaly. Bladder is not palpable. No evidence of flank discomfort. No evidence of inguinal hernia.    Genitourinary: The penis is circumcised with no evidence of plaques or induration. The urethral meatus is normal. The testes, epididymides, and cord structures are  normal in size and contour bilaterally. The scrotum is normal in size and contour.    Extremities: No clubbing, cyanosis, or edema        LABS:      Lab Results   Component Value Date    PSADIAG 0.02 11/01/2017        IMPRESSION:    Encounter Diagnoses   Name Primary?    Erectile dysfunction following radical prostatectomy Yes    Male urinary stress incontinence     Prostate cancer     Essential hypertension    HTN, controlled    PLAN:    1. Discussed options for his ED.  He'd like [facility]. Side effects discussed.  A new Rx was given  2. RTC 6 months.    Copy to:

## 2018-01-15 ENCOUNTER — OFFICE VISIT (OUTPATIENT)
Dept: UROLOGY | Facility: CLINIC | Age: 68
End: 2018-01-15
Payer: MEDICARE

## 2018-01-15 VITALS
BODY MASS INDEX: 31.36 KG/M2 | WEIGHT: 224 LBS | HEIGHT: 71 IN | SYSTOLIC BLOOD PRESSURE: 142 MMHG | DIASTOLIC BLOOD PRESSURE: 75 MMHG | HEART RATE: 67 BPM | RESPIRATION RATE: 16 BRPM

## 2018-01-15 DIAGNOSIS — N52.31 ERECTILE DYSFUNCTION FOLLOWING RADICAL PROSTATECTOMY: Primary | ICD-10-CM

## 2018-01-15 DIAGNOSIS — C61 PROSTATE CANCER: ICD-10-CM

## 2018-01-15 PROCEDURE — 99999 PR PBB SHADOW E&M-EST. PATIENT-LVL IV: CPT | Mod: PBBFAC,,, | Performed by: NURSE PRACTITIONER

## 2018-01-15 PROCEDURE — 99213 OFFICE O/P EST LOW 20 MIN: CPT | Mod: S$PBB,,, | Performed by: NURSE PRACTITIONER

## 2018-01-15 PROCEDURE — 54235 NJX CORPORA CAVERNOSA RX AGT: CPT | Mod: PBBFAC | Performed by: NURSE PRACTITIONER

## 2018-01-15 PROCEDURE — 99214 OFFICE O/P EST MOD 30 MIN: CPT | Mod: PBBFAC | Performed by: NURSE PRACTITIONER

## 2018-01-15 RX ORDER — PAPAVERINE HYDROCHLORIDE 30 MG/ML
INJECTION INTRAMUSCULAR; INTRAVENOUS
Qty: 5 ML | Refills: 11 | Status: SHIPPED | OUTPATIENT
Start: 2018-01-15 | End: 2020-09-09

## 2018-01-15 NOTE — PATIENT INSTRUCTIONS
Penile Self-Injection Procedure  Self-injection is a good option if you have erectile dysfunction (ED). You insert a tiny needle into your penis and inject a medicine. This helps your penis get hard and stay that way long enough for sex. And sex and orgasm will feel as good as always. You may be nervous about doing self-injection at first. But with practice, it will get easier. Your healthcare provider will show you how to do self-injection the first time.  Talk to your doctor about any medicines you take and any medical problems you have.      Preparing for injection  · Wash your hands well with soap and water.  · Prepare the medicine (if needed).  · Sit or  a comfortable position in a warm, well-lit room. If you need to, sit or  front of a mirror.  · Find an injection site on one side of your penis, in a place with no visible veins. (Dont inject into the top, bottom, or head of the penis.)  · Clean the injection site with an alcohol swab. Grasp the head of your penis firmly with your thumb and forefinger (dont just pinch the skin). Stretch the penis so the skin on the shaft is taut.  Injecting the medicine  · Rest your penis against your inner thigh and pull it gently toward your knee. Dont twist or rotate it. This way youll be sure to inject the medicine into the spot you chose and cleaned before.  · Hold the syringe between your thumb and fingers, like youre holding a pen. Rest your forearm on your thigh for support.  · Insert the needle at a 90° angle (perpendicular) to the shaft. Do this quickly to reduce discomfort. (The needle should go in easily. If it doesnt, stop right away.)  · Move your thumb to the plunger. Press down to inject the medicine, counting to 5.  · Remove the needle and dispose of it safely.  Gaining an erection  · Apply pressure to the injection site for a few minutes. This prevents swelling and bruising and helps spread the medicine.   · Stand up. This may help your  erection develop. Foreplay often helps, too.  · Your penis should become firm within 10 to 20 minutes. The erection will last long enough for sex, and maybe longer.  When to seek medical care  An erection that lasts longer than 3 to 4  hours  · Bleeding or bruising  · Severe pain  · Scarring or curvature of the penis   Date Last Reviewed: 1/7/2017  © 5740-4659 [employer]. [address] [place] [address], [place] [address]. All rights reserved. This information is not intended as a substitute for professional medical care. Always follow your healthcare professional's instructions.

## 2018-01-15 NOTE — PROGRESS NOTES
Mr. Senior is a 67 y.o. male presenting with pep teaching. He presents with his medication and syringes.     PRESENTING ILLNESS:    Ryder Senior is a 67 y.o. male s/p RALP on 9/25/2017 by Dr. Yanes.  Since then, he c/o ED.  Prior to surgery, he did have ED.  He got good results with Viagra 50 mg.  His ED has been present for > 1 year.  He's been using Cialis QOD for rehab.  He hasn't gotten an erection.  He presents for pep teaching. He presents with his medication and syringes.     He does have minimal TIMBO.  He is wearing 1 pads/day.  No hematuria.  No dysuria.  Good FOS.    No bone pain or weight loss.    REVIEW OF SYSTEMS:    Ryder Senior denies headache, blurred vision, fever, nausea, vomiting, chills, flank discomfort, abdominal pain, bleeding per rectum, cough, SOB, recent loss of consciousness, recent mental status changes, seizures, dizziness, or upper or lower extremity weakness.    NOE  1. 1  2. 0  3. 0  4. 0  5. 0       PATIENT HISTORY:    Past Medical History:   Diagnosis Date    Allergy     Hypertension     Prostate cancer        Family History   Problem Relation Age of Onset    Prostate cancer Father     Alzheimer's disease Father     Cancer Mother     Prostate cancer Maternal Grandfather        Past Surgical History:   Procedure Laterality Date    corpal tunnel  2014    ELBOW ARTHROPLASTY  2012    HERNIA REPAIR      KNEE ARTHROSCOPY  2002    PROSTATECTOMY  09/2017    pelon yanes md - [facility]       Social History     Social History    Marital status:      Spouse name: N/A    Number of children: N/A    Years of education: N/A     Social History Main Topics    Smoking status: Never Smoker    Smokeless tobacco: Never Used    Alcohol use 1.2 oz/week     2 Cans of beer per week    Drug use: No    Sexual activity: Yes     Partners: Female     Other Topics Concern    None     Social History Narrative    None       Review of patient's allergies indicates:    Allergen Reactions    Coconut Hives    Pineapple Hives         Current Outpatient Prescriptions:     acetaminophen (TYLENOL) 325 MG tablet, Take 325 mg by mouth every 6 (six) hours as needed for Pain., Disp: , Rfl:     alprazolam (XANAX) 0.25 MG tablet, Take 1 tablet (0.25 mg total) by mouth nightly as needed for Anxiety., Disp: 30 tablet, Rfl: 1    docusate sodium (COLACE) 250 MG capsule, Take 250 mg by mouth once daily., Disp: , Rfl:     GREEN TEA EXTRACT ORAL, Take by mouth., Disp: , Rfl:     losartan (COZAAR) 50 MG tablet, Take 25 mg by mouth once daily. Patient takes 0.5 to 1 whole tablet at night, Disp: , Rfl:     multivitamin capsule, Take 1 capsule by mouth once daily., Disp: , Rfl:     papaverine 30 mg/mL injection, Add Phentolamine 1 mg/cc Add PGE1 10 mcg/cc  SIG: Bring to office for test dose in physician office, Disp: 5 mL, Rfl: 0    sildenafil (VIAGRA) 50 MG tablet, Take 50 mg by mouth daily as needed for Erectile Dysfunction., Disp: , Rfl:     tadalafil (CIALIS) 5 MG tablet, Take 1 PO QOD, Disp: 30 tablet, Rfl: 11      PHYSICAL EXAMINATION:    The patient generally appears in good health, is appropriately interactive, and is in no apparent distress.     Eyes: anicteric sclerae, moist conjunctivae; no lid-lag; PERRLA     HENT: Atraumatic; oropharynx clear with moist mucous membranes and no mucosal ulcerations;normal hard and soft palate. No evidence of lymphadenopathy.    Neck: Trachea midline.  No thyromegaly.    Musculoskeletal: No abnormal gait.    Skin: No lesions.    Mental: Cooperative with normal affect.  Is oriented to time, place, and person.    Neuro: Grossly intact.    Chest: Normal inspiratory effort.   No accessory muscles.  No audible wheezes.  Respirations symmetric on inspiration and expiration.    Heart: Regular rhythm.      Abdomen:  Soft, non-tender. No masses or organomegaly. Bladder is not palpable. No evidence of flank discomfort. No evidence of inguinal  hernia.    Genitourinary: The penis is circumcised with no evidence of plaques or induration. The urethral meatus is normal. The testes, epididymides, and cord structures are normal in size and contour bilaterally. The scrotum is normal in size and contour.    Extremities: No clubbing, cyanosis, or edema        LABS:      Lab Results   Component Value Date    PSADIAG 0.02 11/01/2017        IMPRESSION:    Encounter Diagnoses   Name Primary?    Erectile dysfunction following radical prostatectomy Yes    Prostate cancer       HTN, controlled    PLAN:    We discussed ED and the contributing factors. We reviewed his personal factors that contribute to ED. Patient was educated on ED treatments. We discussed PDE-5 inhibitors, OLE, Muse, and IPP.    He is here today for the Intracorporal injection/ICI education and first injection.  Educational materials were supplied.    ICI is an injectable medication that consists of three different medications to produce an erection. It is known as a Trimix Compound or PEP. The medication is a compound medication and is only mixed up at certain pharmacies known as a compound pharmacy. The medication has to be stored in the refrigerator. Improper storage decreases the effectiveness of the medication.     He was educated that it is an injection. With any injection there is risk for possible pain at the injection site as well as risk for an infection. Clean technique must be followed to help prevent infection. Proper needle disposable is necessary. He voices understanding.    The injection is best given when standing up and the penis is positioned perpendicular to the body. The urethral opening should be facing away from the body. Injection is always given on the lateral or side of the penis. Never give the injection to the top of the penis to avoid possible trauma to arteries and veins. Never give the injection to the bottom of the penis to avoid trauma to the urethra. He should  alternate the injection sites to avoid the build up of scar tissue due to multiple injections.    This medication can increase the risk of erections lasting longer than 4 hours. This is known as a priapism and is a medical emergency. This requires immediate medical attention. This is main reason we give the first dose in the office today. We start at low dose and see how well the patients reacts. We can increase the medication if needed depending on the reaction the patient receives today. We discussed the fine line between enough medication for penetration and too much leading to a priapism. He voices understanding.    He witnessed me draw up 10 units and he also zofia up 10 units. He injected himself in the right lateral aspect of the penis with 10 units. Within 10 minutes he achieved an erection that he was pleased with and rated it a 7/10 on the erection scale. After 50 minutes he stated that the erection had increased to a 10/10 and he was experiencing penile pain. I mixed 1 cc phenylephrine with 19 cc of normal saline. I monitored his BP, HR, and O2 sats. (initial vitals: 135/69 and Hr 67) I injected his left lateral aspect of his penis with 1 cc of the phenylephrine and normal saline. After 3 minutes there was no improvement with his erection or pain, I injected his right lateral aspect of his penis with 1 cc of the phenylephrine and normal saline (104/55 and hr 71). After 10 minutes there was slight improvement with a decreased erection and penile pain,  I injected his left lateral aspect of his penis with 1 cc of the phenylephrine and normal saline (146/75 and HR 56). After 10 minutes, he states that there was much improvement with a decreased erection and decreased penile pain (142/82 and HR 57). I continued to monitor his symptoms and vital signs. (141/82 and hr 61; 131/82 and hr 62; 124/82 and hr 66; 125/72 and hr 71; His O2 sats maintained between 96-98%) After 40 minutes, he denied pain and stated that  his erection was gone. I also assessed that his erection was not present. He denies chest pain, SOB, dizziness, or a feeling of light headed.     He was instructed to keep the dosage at 3 units. If noticing a decrease in reaction he can increase the dosage by 2 units or 0.02 ml. He was handed a refill of the Rx.     If have any questions, please give me a call. Educational materials were given to patient and all questions were answered. He voiced understanding and left the office without a priapism. Follow up needed once a year or prn.

## 2018-02-06 ENCOUNTER — CLINICAL SUPPORT (OUTPATIENT)
Dept: REHABILITATION | Facility: HOSPITAL | Age: 68
End: 2018-02-06
Payer: MEDICARE

## 2018-02-06 DIAGNOSIS — N39.3 MALE URINARY STRESS INCONTINENCE: ICD-10-CM

## 2018-02-06 DIAGNOSIS — M62.58 PELVIC FLOOR MUSCLE WASTING IN MALE: ICD-10-CM

## 2018-02-06 PROCEDURE — 97110 THERAPEUTIC EXERCISES: CPT

## 2018-02-06 PROCEDURE — G8991 OTHER PT/OT GOAL STATUS: HCPCS | Mod: CK

## 2018-02-06 PROCEDURE — G8992 OTHER PT/OT  D/C STATUS: HCPCS | Mod: CJ

## 2018-02-06 NOTE — PATIENT INSTRUCTIONS
Home Program: 2/6/2018    1. Continue 40 Kegels per day for another 2-4 weeks.  This can be done in standing, standing on one foot, and during squat/lift.  Sequence is Kegel first, then maintain Kegel lift while lowering and rising from squat/lift.      2. Stronger leg muscles will decrease your tendency to breath hold for strength and stability.      3. Breathing during exertion is key.    Maintenance: when you are dry during the day (or nearly so), decrease to one set of Kegels per day, holding 10 sec.  You may want to keep squats as part of your daily workout.

## 2018-02-06 NOTE — PROGRESS NOTES
OUTPATIENT PHYSICAL THERAPY DAILY NOTE       [id number]    Diagnosis:   Encounter Diagnoses   Name Primary?    Pelvic floor muscle wasting in male     Male urinary stress incontinence       Date of treatment: 02/06/2018     Time in: 2:05  Time out: 2:50  Billable time: 40 minutes  Visit #: 5      SUBJECTIVE   Pt reports: voiding to urge every 2 hours.  He leaks with urge to void, cough/sneeze, and with lifting over 20#.  Nocturia x 3, no leakage.  2 thin pads pads per day.  Has been doing Kegels while he walks.      Pain: 0/10 on VAS scale  Pain location: NA    OBJECTIVE     Therapeutic Exercise to develop strength and endurance for 40 minutes including: Kegels with assist of SEMG.  We worked in sitting and standing with external lead wires.  He demonstrated normal baseline resting, fair WR rise, and good holding in 10 sec Kegels. (WR rise not much different in the seated position, compared with last session).  He demonstrated improved ability to maintain lift and breathe at the same time, but this fell apart a little when we worked on squatting and lifting in standing (30# chair).  He tended to breath hold when lifting, but with verbal cues and repetition he could correct this.    Please refer to pt. Instructions for revised home program.     Education: Discussed progression of plan of care with patient; educated pt in activity modification; reviewed HEP with pt. Pt demonstrated and verbalized understanding of all instruction and was provided with a handout of HEP (see Patient Instructions).      ASSESSMENT      Pt tolerated entire treatment well with no visible adverse effects. He is I with his HEP and is leaking much less.  No further need for PT services at this time.     Goals:  Pt will report improved ability to perform ADLs (ie. dressing, bathing, functional transfers) with little (drops) to no urinary leakage 7/7 days per week.MET  Pt will report improved ability to  perform iADLs (ie. driving, home chores, grocery shopping) with little (drops) to no urinary leakage 7/7 days per week. MET  Pt will report improved ability to perform advanced iADLs (ie. gardening, yard work, heavy lifting, moving furniture) with little (drops) to no urinary leakage 7/7 days per week. NOT MET  Pt will report improved ability to cough/sneeze/laugh/yell with little (drops) to no urinary leakage 7/7 days per week. MET  Pt will report a decrease in pad use to 3 pads per day.MET  Pt will urinate without crede/Valsalva to prevent adverse effects to adjacent structures. MET  Pt/family will be independent with HEP for continued self-management of symptoms. MET    Patient reports that he/she is down to 2 small pads per day- minimal leakage().  The current impairment level is 21 percent impaired (CJ) based on the (HARSH-6) score of 5/24.  Patients therapy goal has been achieved and is 21 percent impaired () at discharge.    PLAN     D/c PT

## 2018-03-01 ENCOUNTER — OFFICE VISIT (OUTPATIENT)
Dept: UROLOGY | Facility: CLINIC | Age: 68
End: 2018-03-01
Payer: MEDICARE

## 2018-03-01 VITALS
HEIGHT: 71 IN | BODY MASS INDEX: 31.36 KG/M2 | HEART RATE: 72 BPM | SYSTOLIC BLOOD PRESSURE: 120 MMHG | RESPIRATION RATE: 16 BRPM | WEIGHT: 224 LBS | DIASTOLIC BLOOD PRESSURE: 72 MMHG

## 2018-03-01 DIAGNOSIS — C61 PROSTATE CANCER: Primary | ICD-10-CM

## 2018-03-01 PROBLEM — I10 HYPERTENSION: Status: RESOLVED | Noted: 2017-02-07 | Resolved: 2018-03-01

## 2018-03-01 PROBLEM — N52.31 ERECTILE DYSFUNCTION FOLLOWING RADICAL PROSTATECTOMY: Status: RESOLVED | Noted: 2017-11-02 | Resolved: 2018-03-01

## 2018-03-01 PROCEDURE — 99213 OFFICE O/P EST LOW 20 MIN: CPT | Mod: PBBFAC | Performed by: UROLOGY

## 2018-03-01 PROCEDURE — 99999 PR PBB SHADOW E&M-EST. PATIENT-LVL III: CPT | Mod: PBBFAC,,, | Performed by: UROLOGY

## 2018-03-01 PROCEDURE — 99214 OFFICE O/P EST MOD 30 MIN: CPT | Mod: S$PBB,,, | Performed by: UROLOGY

## 2018-03-01 RX ORDER — TADALAFIL 20 MG/1
20 TABLET ORAL DAILY PRN
Qty: 10 TABLET | Refills: 11 | Status: SHIPPED | OUTPATIENT
Start: 2018-03-01 | End: 2021-01-19

## 2018-03-01 NOTE — PROGRESS NOTES
Clinic Note  3/1/2018      Subjective:         Chief Complaint:   HPI  Ryder Senior is a 68 y.o. male s/p robotic assisted laparoscopic prostatectomy 9/25/2017.  Had a complicated postop course. Admitted with ileus to [facility], treated with NG and antibiotics. Subsequently developed DVT and C Diff.  Dry at night, sitting, leaks. Working with Jazzy every 2 weeks. Doing Kegels. 15-20 pads/ day.  Daughter had liver transplant 1 month ago.     Path:  Microscopic Examination  Prostate: Radical prostatectomy with lymph node excisions  Specimen size:  Weight: 50 g.  Size: 5 x 3.5 x 3.2 cm.  Histologic type:Acinar adenocarcinoma  Primary Luis Pattern: 3  Secondary Driftwood Pattern: 2  Total Driftwood Score: 5  Grade Group: 1  Tumor quantitation: There is only a modest total volume of adenocarcinoma present. The carcinoma is present in the  left base, the right and left anterior regions, and the apical right posterior region with a minute amount in the right  posterior basilar region. The longest length of carcinoma in a slide is 1.6 cm area      Lab Results   Component Value Date    PSADIAG 0.02 11/01/2017      Past Medical History:   Diagnosis Date    a H/O Post-Prostatectomy RLE DVT In 09/2017     [facility] 9/28/17-10/6/17 Stay Included This DX; He Was TXd With Eliquis X 6 Months    b Hypertension     j H/O Clostridium Difficile Colitis In 09/2017     [facility] 9/28/17-10/6/17 Stay Included This DX    j H/O Hernia Repair Surgery     j H/O Post-Prostatectomy Ileus In 09/2017     [facility] 9/28/17-10/6/17 Stay Included This DX    k Erectile Dysfunction After Prostatectomy     Dr. Tyrese Yanes; On Papaverine Penile Injections PRN    k Prostate Cancer S/P Laparoscopic Prostatectomy 09/2017     Dr. Tyrese Yanes    l H/O Arthroscopic Elbow Surgery     l H/O Arthroscopic Knee Surgery     l H/O Carpal Tunnel Release Surgery     m Family H/O Alzheimer's Disease     His Father    o Allergic Rhinosinusitis      Family History    Problem Relation Age of Onset    Prostate cancer Father     Alzheimer's disease Father     Cancer Mother     Prostate cancer Maternal Grandfather      Social History     Social History    Marital status:      Spouse name: N/A    Number of children: N/A    Years of education: N/A     Occupational History    Not on file.     Social History Main Topics    Smoking status: Never Smoker    Smokeless tobacco: Never Used    Alcohol use 1.2 oz/week     2 Cans of beer per week    Drug use: No    Sexual activity: Yes     Partners: Female     Other Topics Concern    Not on file     Social History Narrative    No narrative on file     Past Surgical History:   Procedure Laterality Date    corpal tunnel  2014    ELBOW ARTHROPLASTY  2012    HERNIA REPAIR      KNEE ARTHROSCOPY  2002    PROSTATECTOMY  09/2017    pelon hidalgo md - [facility]     Patient Active Problem List   Diagnosis    Acute deep vein thrombosis (DVT) of right lower extremity    Pelvic floor muscle wasting in male    Male urinary stress incontinence    Hypertension    Allergic rhinosinusitis    Prostate Cancer S/P Laparoscopic Prostatectomy 09/2017    Erectile Dysfunction After Prostatectomy    H/O Clostridium Difficile Colitis In 09/2017    H/O Post-Prostatectomy Ileus In 09/2017    H/O Post-Prostatectomy RLE DVT In 09/2017    Family H/O Alzheimer's Disease    H/O Arthroscopic Knee Surgery    H/O Carpal Tunnel Release Surgery    H/O Arthroscopic Elbow Surgery    H/O Hernia Repair Surgery     Review of Systems   Constitutional: Negative for appetite change, chills, fatigue, fever and unexpected weight change.   HENT: Negative for nosebleeds.    Respiratory: Negative for shortness of breath and wheezing.    Cardiovascular: Negative for chest pain, palpitations and leg swelling.   Gastrointestinal: Negative for abdominal distention, abdominal pain, constipation, diarrhea, nausea and vomiting.   Genitourinary:  "Negative for dysuria and hematuria.   Musculoskeletal: Negative for arthralgias and back pain.   Skin: Negative for pallor.   Neurological: Negative for dizziness, seizures and syncope.   Hematological: Negative for adenopathy.   Psychiatric/Behavioral: Negative for dysphoric mood.         Objective:      /72   Pulse 72   Resp 16   Ht 5' 11" (1.803 m)   Wt 101.6 kg (224 lb)   BMI 31.24 kg/m²   Estimated body mass index is 31.24 kg/m² as calculated from the following:    Height as of this encounter: 5' 11" (1.803 m).    Weight as of this encounter: 101.6 kg (224 lb).  Physical Exam   Constitutional: He is oriented to person, place, and time. He appears well-developed and well-nourished. No distress.   HENT:   Head: Atraumatic.   Neck: No tracheal deviation present.   Cardiovascular: Normal rate.    Pulmonary/Chest: Effort normal. No respiratory distress. He has no wheezes.   Abdominal: Soft. Bowel sounds are normal. He exhibits no distension and no mass. There is no tenderness. There is no rebound and no guarding.   Neurological: He is alert and oriented to person, place, and time.   Skin: Skin is warm and dry. He is not diaphoretic.     Psychiatric: He has a normal mood and affect. His behavior is normal. Judgment and thought content normal.         Assessment and Plan:           Problem List Items Addressed This Visit     Prostate Cancer S/P Laparoscopic Prostatectomy 09/2017 - Primary          Follow up:   6 months with PSA.    Tyrese Yanes        "

## 2018-03-02 ENCOUNTER — LAB VISIT (OUTPATIENT)
Dept: LAB | Facility: HOSPITAL | Age: 68
End: 2018-03-02
Attending: UROLOGY
Payer: MEDICARE

## 2018-03-02 DIAGNOSIS — C61 PROSTATE CANCER: ICD-10-CM

## 2018-03-02 LAB — COMPLEXED PSA SERPL-MCNC: <0.01 NG/ML

## 2018-03-02 PROCEDURE — 84153 ASSAY OF PSA TOTAL: CPT

## 2018-03-02 PROCEDURE — 36415 COLL VENOUS BLD VENIPUNCTURE: CPT

## 2018-03-08 ENCOUNTER — OFFICE VISIT (OUTPATIENT)
Dept: UROLOGY | Facility: CLINIC | Age: 68
End: 2018-03-08
Payer: MEDICARE

## 2018-03-08 VITALS
HEIGHT: 71 IN | WEIGHT: 222 LBS | SYSTOLIC BLOOD PRESSURE: 130 MMHG | HEART RATE: 68 BPM | BODY MASS INDEX: 31.08 KG/M2 | TEMPERATURE: 98 F | DIASTOLIC BLOOD PRESSURE: 97 MMHG

## 2018-03-08 DIAGNOSIS — Z90.79 HISTORY OF RADICAL PROSTATECTOMY: ICD-10-CM

## 2018-03-08 DIAGNOSIS — C61 PROSTATE CANCER: ICD-10-CM

## 2018-03-08 DIAGNOSIS — N52.31 ERECTILE DYSFUNCTION FOLLOWING RADICAL PROSTATECTOMY: Primary | ICD-10-CM

## 2018-03-08 DIAGNOSIS — N48.33 DRUG-INDUCED PRIAPISM: ICD-10-CM

## 2018-03-08 PROCEDURE — 99214 OFFICE O/P EST MOD 30 MIN: CPT | Mod: PBBFAC | Performed by: NURSE PRACTITIONER

## 2018-03-08 PROCEDURE — 99999 PR PBB SHADOW E&M-EST. PATIENT-LVL IV: CPT | Mod: PBBFAC,,, | Performed by: NURSE PRACTITIONER

## 2018-03-08 PROCEDURE — 99215 OFFICE O/P EST HI 40 MIN: CPT | Mod: S$PBB,,, | Performed by: NURSE PRACTITIONER

## 2018-03-08 RX ORDER — CALCIUM CARB/VITAMIN D3/VIT K1 500-100-40
TABLET,CHEWABLE ORAL
Qty: 10 EACH | Refills: 12 | Status: SHIPPED | OUTPATIENT
Start: 2018-03-08

## 2018-03-08 NOTE — PATIENT INSTRUCTIONS
L-Arginine 1000mg Daily      Understanding Erectile Dysfunction    Erectile dysfunction (ED) is a problem getting an erection firm enough or keeping it long enough for intercourse. The problem can happen to any man at any age. But health problems that can lead to ED become more common as a man ages. Up to half of men over age 40 experience ED at some point.  Causes of ED  ED can have many causes. Most are physical. Some are emotional issues. Often, a combination of causes is involved. Causes of ED may include:  · Medical conditions such as diabetes or depression  · Smoking tobacco or marijuana  · Drinking too much alcohol  · Side effects of medications  · Injury to nerves or blood vessels  · Emotional issues such as stress or relationship problems  ED can be treated  Prescription medications for ED are available. They help many men who try them. Depending upon the cause of the ED, though, medications may not be enough. In these cases, other treatment options are available. These include erectile aids and surgery. Your health care provider can tell you more about the treatment that is right for you. And new treatments for ED are being studied. No matter what the treatment you decide on, stay in touch with your doctor. If your symptoms persist, he or she may be able to adjust your current treatment or try something new.  Date Last Reviewed: 1/1/2017  © 3172-9372 [employer]. [address] [place] [address], [place] [address]. All rights reserved. This information is not intended as a substitute for professional medical care. Always follow your healthcare professional's instructions.        Penile Self-Injection: Notes and Precautions     Man and healthcare provider sitting across from one another, talking.   Penile self-injection is a simple technique that may improve your sex life. Some men even find that self-injection leads to an increase in natural erections. If you have questions or concerns about  self-injection or erectile dysfunction (ED), talk with your healthcare provider. The information on this sheet will help you get the best results.  Notes about penile self-injection  · You may feel a mild burning during injection. This is OK. But if you feel pressure or severe pain, stop the injection. There may be a problem with the injection site.  · Only inject the medicine on the side of your penis. It may not work if injected elsewhere.  · To prevent scarring, inject in a different spot each time.  · Dont use this treatment if you have a bleeding disorder or any risk of infection.  · Get medical help right away if your erection lasts longer than 3 to 4 hours.  Work with your healthcare provider  Ask how often you can safely repeat injections, as well as any other questions you have. You and your healthcare provider will talk about follow-up exams and how to get supplies. If the medicine doesnt work or stops working over time, tell your healthcare provider.     When to call your healthcare provider  Call your healthcare provider right away if any of these occur:  · An erection that lasts longer than 3 to 4  hours  · Bleeding or bruising  · Severe pain  · Scarring or curvature of the penis   Date Last Reviewed: 1/1/2017  © 5763-9832 [employer]. [address] [place] [address], [place] [address]. All rights reserved. This information is not intended as a substitute for professional medical care. Always follow your healthcare professional's instructions.        Priapism  Priapism is an erection that lasts more than 4 hours without sexual stimulation. Often, the cause of priapism is not identified. In adults, some causes include sickle cell disease and medicine or drug side-effects. Several prescription medicines and injections have been associated with priapism, as well as alcohol, marijuana, and cocaine.  Children with leukemia can get priapism. This is because blood flow in the penis gets blocked by the  large number of white blood cells in the blood stream.  Priapism is very serious. When erections last over 4 hours, there is less blood flow to the penis. This can cause cell damage from lack of oxygen. The longer you have the erection before treatment, the worse the outcome. Those treated within 4 to 6 hours do best. However, up to half of all men who get priapism will have some degree of erection problems in the future.   Home care  · Rest and avoid any attempt at sexual stimulation as advised by your healthcare provider.  · Stay away from factors that may trigger priapism. These may include certain medicines, marijuana, and alcohol.  · Ask your urologist about medicines that can be used at home at the earliest sign of a recurrence.  · The drug hydroxyurea may help prevent priapism in people with sickle cell disease.  Follow-up care  Follow up with your urologist, or as advised.  Call 911  Call 911 if you have:  · Shortness of breath or chest pain  When to seek medical advice  Call your healthcare provider if any of these occur:  · Having another prolonged erection that does not go away soon after sexual stimulation stops  · Bleeding from puncture sites in the penis where blood was drained  · Increasing pain, redness, or swelling  · Area of skin that is turning black  · Difficulty passing urine  Date Last Reviewed: 10/1/2016  © 4054-6930 [employer]. [address] [place] [address], [place] [address]. All rights reserved. This information is not intended as a substitute for professional medical care. Always follow your healthcare professional's instructions.

## 2018-03-08 NOTE — PROGRESS NOTES
Subjective:       Patient ID: Ryder Senior is a 68 y.o. male.    Chief Complaint: Erectile Dysfunction    Ryder Senior is a 68 y.o. male with Prostate Cancer; s/p robotic assisted laparoscopic prostatectomy 9/25/2017.  [facility] 5 (3+2); (-) SV,extenson, margins (close to left marigin; pT2, pN0, pMx.    Had a complicated postop course. Admitted with ileus to [facility], treated with NG and antibiotics.   Subsequently developed DVT and C Diff.    Last clinic visit was 03/01/2018.  He is here today to discuss ICI therapy.  He brought in his own medication.    He using at home and getting mixed results.  He may use 6-7 units and have an erection lasting longer than 4 hours (max was 5).  His erections become painful after 1 hour. Unrelieved with Tylenol; takes sudafed but not helping.  5 units gets him mixed results: one time good; other time not firm enough no pain.   <4 nothing.                           PSA                  <0.01               03/02/2018                 PSA                  0.02                11/01/2017                  Past Medical History:  No date: a H/O Post-Prostatectomy RLE DVT In 09/2017      Comment: [facility] 9/28/17-10/6/17 Stay Included This DX; He               Was TXd With Eliquis X 6 Months  No date: b Hypertension  No date: i Acute Bronchitis 03/2018  No date: j Family H/O Colon Cancer      Comment: His Daughter  No date: j H/O Clostridium Difficile Colitis In 09/2017      Comment: [facility] 9/28/17-10/6/17 Stay Included This DX  No date: j H/O Hernia Repair Surgery  No date: j H/O Post-Prostatectomy Ileus In 09/2017      Comment: [facility] 9/28/17-10/6/17 Stay Included This DX  No date: k Erectile Dysfunction After Prostatectomy      Comment: Dr. Tyrese Yanes; On Papaverine Penile                Injections PRN  No date: k Prostate Cancer S/P Laparoscopic Prostatecto*      Comment: Dr. Tyrese Yanes  No date: l H/O Arthroscopic Elbow Surgery  No date: l H/O Arthroscopic Knee Surgery  No date: l  H/O Carpal Tunnel Release Surgery  No date: m Family H/O Alzheimer's Disease      Comment: His Father    Past Surgical History:  2014: corpal tunnel  2012: ELBOW ARTHROPLASTY  No date: HERNIA REPAIR  2002: KNEE ARTHROSCOPY  09/2017: PROSTATECTOMY      Comment: pelon hidalgo md - [facility]    Review of patient's family history indicates:    Prostate cancer                Father                    Alzheimer's disease            Father                    Cancer                         Mother                    Prostate cancer                Maternal Grandfather        Social History    Marital status:              Spouse name:                       Years of education:                 Number of children:               Occupational History    None on file    Social History Main Topics    Smoking status: Never Smoker                                                                Smokeless tobacco: Never Used                        Alcohol use: Yes           1.2 oz/week       Cans of beer: 2 per week    Drug use: No              Sexual activity: Yes               Partners with: Female    Other Topics            Concern    None on file    Social History Narrative    None on file        Allergies:  Coconut and Pineapple    Medications:  Current Outpatient Prescriptions:   acetaminophen (TYLENOL) 325 MG tablet, Take 325 mg by mouth every 6 (six) hours as needed for Pain., Disp: , Rfl:   alprazolam (XANAX) 0.25 MG tablet, Take 1 tablet (0.25 mg total) by mouth nightly as needed for Anxiety., Disp: 30 tablet, Rfl: 1  benzonatate (TESSALON) 200 MG capsule, Take 1 capsule (200 mg total) by mouth 3 (three) times daily as needed for Cough., Disp: 30 capsule, Rfl: 0  doxycycline (VIBRAMYCIN) 100 MG Cap, Take 1 capsule (100 mg total) by mouth 2 (two) times daily with meals., Disp: 20 capsule, Rfl: 0  GREEN TEA EXTRACT ORAL, Take by mouth., Disp: , Rfl:   losartan (COZAAR) 50 MG tablet, Take 0.5-1 tablets (25-50  mg total) by mouth once daily. Patient takes 0.5 to 1 whole tablet at night, Disp: 90 tablet, Rfl: 3  multivitamin capsule, Take 1 capsule by mouth once daily., Disp: , Rfl:   papaverine 30 mg/mL injection, Add Phentolamine 1 mg/cc Add PGE1 10 mcg/cc, Disp: 5 mL, Rfl: 11  tadalafil (CIALIS) 20 MG Tab, Take 1 tablet (20 mg total) by mouth daily as needed., Disp: 10 tablet, Rfl: 11          Review of Systems   Constitutional: Negative for activity change, appetite change, chills and fever.   HENT: Negative for facial swelling and trouble swallowing.    Eyes: Negative for visual disturbance.   Respiratory: Negative for chest tightness and shortness of breath.    Cardiovascular: Negative for chest pain and palpitations.   Gastrointestinal: Negative.  Negative for abdominal pain, constipation, diarrhea, nausea and vomiting.   Genitourinary: Negative for difficulty urinating, dysuria, flank pain, hematuria, penile pain, penile swelling, scrotal swelling and testicular pain.        Ok with urination.     Musculoskeletal: Negative for back pain, gait problem, myalgias and neck stiffness.   Skin: Negative for rash.   Neurological: Negative for dizziness and speech difficulty.   Hematological: Does not bruise/bleed easily.   Psychiatric/Behavioral: Negative for behavioral problems.       Objective:      Physical Exam   Nursing note and vitals reviewed.  Constitutional: He is oriented to person, place, and time. He appears well-developed and well-nourished.  Non-toxic appearance. He does not have a sickly appearance.   HENT:   Head: Normocephalic and atraumatic.   Right Ear: External ear normal.   Left Ear: External ear normal.   Nose: Nose normal.   Mouth/Throat: Mucous membranes are normal.   Eyes: Conjunctivae and lids are normal. No scleral icterus.   Neck: Trachea normal, normal range of motion and full passive range of motion without pain. Neck supple. No JVD present. No tracheal deviation present.   Cardiovascular:  Normal rate, S1 normal and S2 normal.    Pulmonary/Chest: Effort normal. No respiratory distress. He exhibits no tenderness.   Abdominal: Soft. Normal appearance and bowel sounds are normal. There is no hepatosplenomegaly. There is no tenderness. There is no CVA tenderness.   Genitourinary: Testes normal and penis normal. Circumcised. No penile tenderness.       Musculoskeletal: Normal range of motion.   Neurological: He is alert and oriented to person, place, and time. He has normal strength.   Skin: Skin is warm, dry and intact.     Psychiatric: He has a normal mood and affect. His behavior is normal. Judgment and thought content normal.       Assessment:       1. Erectile dysfunction following radical prostatectomy    2. Prostate cancer    3. Drug-induced priapism    4. History of radical prostatectomy        Plan:         I spent 60 minutes with the patient of which more than half was spent in direct consultation with the patient in regards to our treatment and plan.    Education and recommendations of today's plan of care including home remedies.  We discussed ED and the contributing factors. We reviewed his personal factors that contribute to ED. Patient was educated on ED treatments. We discussed PDE-5 inhibitors, OLE, Muse, and IPP.  He was educated that it is an injection.   We discussed his injection technique; and medication storage.  He brought his own medication;  I witnessed him draw up his on medication (5 units) and he injected it properly into the right side of his penis.  Within 10 minutes, he achieved a soft filling erection (6) erection score.  After another 20 minutes his erection did become firmer. He was pleased and was not causing any discomfort.  He states this erection is functional; not as strong as when he would give himself 7 units but that erection lasted >4 hours and was painful.    We discussed his dosing would be tricky due to his recent RALP  Discussed that nothing is 100%; and  increasing and cause priapism.  He may not get a full erection with every injection but he does get a filling out which is appropriate for penile rehab.  The goal is to get his erections back to where they were before surgery; discussed that is not always the case.  He was pleased with the reaction with 5 units; new Rx for 0.3ml needles to make the titration easier.  Motrin for discomfort.  L-Arginine daily  If have any questions, please give me a call. Educational materials were given to patient and all questions were answered.   He voiced understanding and left the office without a priapism.

## 2018-06-12 ENCOUNTER — LAB VISIT (OUTPATIENT)
Dept: LAB | Facility: HOSPITAL | Age: 68
End: 2018-06-12
Attending: INTERNAL MEDICINE
Payer: MEDICARE

## 2018-06-12 DIAGNOSIS — E07.9 THYROID DISORDER: ICD-10-CM

## 2018-06-12 DIAGNOSIS — Z11.59 NEED FOR HEPATITIS C SCREENING TEST: ICD-10-CM

## 2018-06-12 DIAGNOSIS — E78.49 OTHER HYPERLIPIDEMIA: ICD-10-CM

## 2018-06-12 DIAGNOSIS — D64.9 NORMOCYTIC ANEMIA: ICD-10-CM

## 2018-06-12 DIAGNOSIS — Z51.81 THERAPEUTIC DRUG MONITORING: ICD-10-CM

## 2018-06-12 LAB
ALBUMIN SERPL BCP-MCNC: 3.9 G/DL
ALP SERPL-CCNC: 62 U/L
ALT SERPL W/O P-5'-P-CCNC: 26 U/L
ANION GAP SERPL CALC-SCNC: 7 MMOL/L
AST SERPL-CCNC: 39 U/L
BASOPHILS # BLD AUTO: 0.04 K/UL
BASOPHILS NFR BLD: 0.9 %
BILIRUB SERPL-MCNC: 1.3 MG/DL
BUN SERPL-MCNC: 17 MG/DL
CALCIUM SERPL-MCNC: 9.2 MG/DL
CHLORIDE SERPL-SCNC: 105 MMOL/L
CHOLEST SERPL-MCNC: 207 MG/DL
CHOLEST/HDLC SERPL: 3.1 {RATIO}
CO2 SERPL-SCNC: 28 MMOL/L
CREAT SERPL-MCNC: 0.8 MG/DL
DIFFERENTIAL METHOD: NORMAL
EOSINOPHIL # BLD AUTO: 0.1 K/UL
EOSINOPHIL NFR BLD: 2.9 %
ERYTHROCYTE [DISTWIDTH] IN BLOOD BY AUTOMATED COUNT: 12.7 %
EST. GFR  (AFRICAN AMERICAN): >60 ML/MIN/1.73 M^2
EST. GFR  (NON AFRICAN AMERICAN): >60 ML/MIN/1.73 M^2
GLUCOSE SERPL-MCNC: 107 MG/DL
HCT VFR BLD AUTO: 46.9 %
HDLC SERPL-MCNC: 67 MG/DL
HDLC SERPL: 32.4 %
HGB BLD-MCNC: 15.7 G/DL
IMM GRANULOCYTES # BLD AUTO: 0.02 K/UL
IMM GRANULOCYTES NFR BLD AUTO: 0.4 %
LDLC SERPL CALC-MCNC: 130.6 MG/DL
LYMPHOCYTES # BLD AUTO: 1.6 K/UL
LYMPHOCYTES NFR BLD: 34.9 %
MCH RBC QN AUTO: 29.1 PG
MCHC RBC AUTO-ENTMCNC: 33.5 G/DL
MCV RBC AUTO: 87 FL
MONOCYTES # BLD AUTO: 0.4 K/UL
MONOCYTES NFR BLD: 9.8 %
NEUTROPHILS # BLD AUTO: 2.3 K/UL
NEUTROPHILS NFR BLD: 51.1 %
NONHDLC SERPL-MCNC: 140 MG/DL
NRBC BLD-RTO: 0 /100 WBC
PLATELET # BLD AUTO: 230 K/UL
PMV BLD AUTO: 9.4 FL
POTASSIUM SERPL-SCNC: 4.8 MMOL/L
PROT SERPL-MCNC: 6.6 G/DL
RBC # BLD AUTO: 5.39 M/UL
SODIUM SERPL-SCNC: 140 MMOL/L
T4 FREE SERPL-MCNC: 0.86 NG/DL
TRIGL SERPL-MCNC: 47 MG/DL
TSH SERPL DL<=0.005 MIU/L-ACNC: 0.93 UIU/ML
WBC # BLD AUTO: 4.5 K/UL

## 2018-06-12 PROCEDURE — 84439 ASSAY OF FREE THYROXINE: CPT

## 2018-06-12 PROCEDURE — 86803 HEPATITIS C AB TEST: CPT

## 2018-06-12 PROCEDURE — 80053 COMPREHEN METABOLIC PANEL: CPT

## 2018-06-12 PROCEDURE — 85025 COMPLETE CBC W/AUTO DIFF WBC: CPT

## 2018-06-12 PROCEDURE — 36415 COLL VENOUS BLD VENIPUNCTURE: CPT | Mod: PO

## 2018-06-12 PROCEDURE — 84443 ASSAY THYROID STIM HORMONE: CPT

## 2018-06-12 PROCEDURE — 80061 LIPID PANEL: CPT

## 2018-06-13 LAB — HCV AB SERPL QL IA: NEGATIVE

## 2018-06-22 ENCOUNTER — PATIENT MESSAGE (OUTPATIENT)
Dept: UROLOGY | Facility: CLINIC | Age: 68
End: 2018-06-22

## 2018-07-17 ENCOUNTER — TELEPHONE (OUTPATIENT)
Dept: SURGERY | Facility: CLINIC | Age: 68
End: 2018-07-17

## 2018-07-17 ENCOUNTER — OFFICE VISIT (OUTPATIENT)
Dept: SURGERY | Facility: CLINIC | Age: 68
End: 2018-07-17
Payer: MEDICARE

## 2018-07-17 VITALS
TEMPERATURE: 98 F | HEART RATE: 68 BPM | BODY MASS INDEX: 31.5 KG/M2 | HEIGHT: 71 IN | WEIGHT: 225 LBS | DIASTOLIC BLOOD PRESSURE: 77 MMHG | SYSTOLIC BLOOD PRESSURE: 124 MMHG

## 2018-07-17 DIAGNOSIS — K80.20 GALLSTONES: Primary | ICD-10-CM

## 2018-07-17 DIAGNOSIS — K80.20 GALLSTONES: ICD-10-CM

## 2018-07-17 DIAGNOSIS — K43.2 INCISIONAL HERNIA, WITHOUT OBSTRUCTION OR GANGRENE: ICD-10-CM

## 2018-07-17 PROCEDURE — 99999 PR PBB SHADOW E&M-EST. PATIENT-LVL III: CPT | Mod: PBBFAC,,, | Performed by: SURGERY

## 2018-07-17 PROCEDURE — 99213 OFFICE O/P EST LOW 20 MIN: CPT | Mod: PBBFAC | Performed by: SURGERY

## 2018-07-17 PROCEDURE — 99203 OFFICE O/P NEW LOW 30 MIN: CPT | Mod: S$PBB,,, | Performed by: SURGERY

## 2018-07-17 NOTE — LETTER
[facility] - General Surgery  [address]  [place] [address]  Phone: [phone] July 17, 2018      Tyrese Yanes MD  [address]  [place] [address]    Patient: Ryder Senior   MR Number: [id number]   YOB: 1950   Date of Visit: 7/17/2018     Dear Dr. Yanes:    Thank you for referring Ryder Senior to me for evaluation. Attached you will find relevant portions of my assessment and plan of care.    ASSESSMENT/PLAN:     Incisional hernia, epigastric pain and gallstones.  Abnormal EKG.    PLAN: Obtain CCKHIDA.  For either lap evan with primary repair of hernia or lap hernia repair with mesh alone.    If you have questions, please do not hesitate to call me. I look forward to following Ryder Senior along with you.    Sincerely,      Johnnie Schultz MD  Section Head - General, Laparoscopic, Bariatric  Acute Care and Oncologic Surgery   - Surgical Weight Loss Program  [facility]    WSR/orion    CC  Wilfrid Patel MD

## 2018-07-17 NOTE — Clinical Note
July 17, 2018      Tyrese Yanes MD  [address]  [place] [address]           [facility] General Surgery  [address]  [facility] [address]  Phone: [phone]          Patient: Ryder Senior   MR Number: [id number]   YOB: 1950   Date of Visit: 7/17/2018       Dear Dr. Tyrese Yanes:    Thank you for referring Ryder Senior to me for evaluation. Attached you will find relevant portions of my assessment and plan of care.    If you have questions, please do not hesitate to call me. I look forward to following Ryder Senior along with you.    Sincerely,    Johnnie Schultz MD    Enclosure  CC:  No Recipients    If you would like to receive this communication electronically, please contact [email or web address] or [phone] to request more information on Mobile Backstage Link access.    For providers and/or their staff who would like to refer a patient to [facility], please contact us through our one-stop-shop provider referral line, [facility] , at [phone].    If you feel you have received this communication in error or would no longer like to receive these types of communications, please e-mail [email or web address]

## 2018-07-17 NOTE — PROGRESS NOTES
History & Physical    SUBJECTIVE:     History of Present Illness:  Patient is a 68 y.o. male presents with gallstones and epigastric/incisional hernia.  He is s/p robotic prostatectomy Sept 2017.  He developed a mass in the area about a month ago.  This is sticking out when he is working.  He also has epigastric pain cephalad to this area starting a few days ago after working very hard which is improved (especially after starting apple cider).  He had some gassiness and diarrhea with this.      Chief Complaint   Patient presents with    Consult     hernia / gallstones       Review of patient's allergies indicates:   Allergen Reactions    Coconut Hives    Pineapple Hives       Current Outpatient Prescriptions   Medication Sig Dispense Refill    acetaminophen (TYLENOL) 325 MG tablet Take 325 mg by mouth every 6 (six) hours as needed for Pain.      alprazolam (XANAX) 0.25 MG tablet Take 1 tablet (0.25 mg total) by mouth nightly as needed for Anxiety. 30 tablet 1    cyclobenzaprine (FLEXERIL) 10 MG tablet Take 1 tablet (10 mg total) by mouth every evening. 30 tablet 5    GREEN TEA EXTRACT ORAL Take by mouth.      insulin syringe-needle U-100 0.3 mL 31 gauge x 5/16 Syrg Use as directed with ICI Therapy 10 each 12    ketoconazole (NIZORAL) 2 % cream Apply topically 2 (two) times daily. 15 g 3    losartan (COZAAR) 50 MG tablet Take 0.5-1 tablets (25-50 mg total) by mouth once daily. Patient takes 0.5 to 1 whole tablet at night 90 tablet 3    magnesium oxide (MAG-OX) 400 mg tablet Take 1 tablet (400 mg total) by mouth once daily.  0    multivitamin capsule Take 1 capsule by mouth once daily.      nystatin (MYCOSTATIN) cream APPLY TOPICALLY TO AFFECTED AREA BID  1    papaverine 30 mg/mL injection Add Phentolamine 1 mg/cc  Add PGE1 10 mcg/cc 5 mL 11    tadalafil (CIALIS) 20 MG Tab Take 1 tablet (20 mg total) by mouth daily as needed. 10 tablet 11     No current facility-administered medications for this visit.         Past Medical History:   Diagnosis Date    a H/O Post-Prostatectomy RLE DVT In 09/2017     [facility] 9/28/17-10/6/17 Stay Included This DX; He Was TXd With Eliquis X 6 Months    b Hypertension     c Mild Hypercholesterolemia With High HDL     Will Monitor    j Family H/O Colon Cancer     His Daughter    j H/O Clostridium Difficile Colitis In 09/2017     [facility] 9/28/17-10/6/17 Stay Included This DX    j H/O Hernia Repair Surgery     j H/O Post-Prostatectomy Ileus In 09/2017     [facility] 9/28/17-10/6/17 Stay Included This DX    k Erectile Dysfunction After Prostatectomy     Dr. Tyrese Yanes; On Papaverine Penile Injections PRN    k Prostate Cancer S/P Laparoscopic Prostatectomy 09/2017     Dr. Tyrese Yanes    l H/O Arthroscopic Elbow Surgery     l H/O Arthroscopic Knee Surgery     l H/O Carpal Tunnel Release Surgery     m Family H/O Alzheimer's Disease     His Father     Past Surgical History:   Procedure Laterality Date    corpal tunnel  2014    ELBOW ARTHROPLASTY  2012    HERNIA REPAIR      KNEE ARTHROSCOPY  2002    PROSTATECTOMY  09/2017    pelon yanes md - [facility]     Family History   Problem Relation Age of Onset    Prostate cancer Father     Alzheimer's disease Father     Cancer Mother     Prostate cancer Maternal Grandfather      Social History   Substance Use Topics    Smoking status: Never Smoker    Smokeless tobacco: Never Used    Alcohol use 1.2 oz/week     2 Cans of beer per week        Review of Systems:  Review of Systems   Constitutional: Negative for fever and unexpected weight change.   Respiratory: Positive for shortness of breath. Negative for cough and chest tightness.    Cardiovascular: Negative for chest pain and leg swelling.   Gastrointestinal: Negative for constipation, nausea and vomiting.   Genitourinary: Negative for difficulty urinating and dysuria.   Skin: Positive for rash. Negative for wound.        Took an antifungal   Neurological: Negative for  "seizures and headaches.   Hematological: Does not bruise/bleed easily.       OBJECTIVE:     Vital Signs (Most Recent)  Temp: 98.3 °F (36.8 °C) (07/17/18 1210)  Pulse: 68 (07/17/18 1210)  BP: 124/77 (07/17/18 1210)  5' 11" (1.803 m)  102.1 kg (225 lb)     Physical Exam:  Physical Exam   Constitutional: He is oriented to person, place, and time. He appears well-developed and well-nourished.   Neck: Normal range of motion. Neck supple.   Cardiovascular: Normal rate, regular rhythm and normal heart sounds.    Pulmonary/Chest: Effort normal and breath sounds normal.   Abdominal: Soft. Normal appearance and bowel sounds are normal. A hernia is present.       Neurological: He is alert and oriented to person, place, and time.   Skin: Skin is warm and dry.   Psychiatric: He has a normal mood and affect. His behavior is normal. Judgment and thought content normal.   Vitals reviewed.      Laboratory  CBC: Reviewed  CMP: Reviewed    Diagnostic Results:  ECG: Reviewed    ASSESSMENT/PLAN:     Incisional hernia, epigastric pain and gallstones.  Abnormal ekg.    PLAN:    Obtain cckhida.  For either lap evan with primary repair of hernia or lap hernia repair with mesh alone.  He has a wedding in September and will be in [place] every other 2 weeks.              "

## 2018-07-19 ENCOUNTER — PATIENT MESSAGE (OUTPATIENT)
Dept: SURGERY | Facility: CLINIC | Age: 68
End: 2018-07-19

## 2018-07-20 ENCOUNTER — PATIENT MESSAGE (OUTPATIENT)
Dept: UROLOGY | Facility: CLINIC | Age: 68
End: 2018-07-20

## 2018-08-03 ENCOUNTER — HOSPITAL ENCOUNTER (OUTPATIENT)
Dept: RADIOLOGY | Facility: HOSPITAL | Age: 68
Discharge: HOME | End: 2018-08-03
Attending: SURGERY
Payer: MEDICARE

## 2018-08-03 DIAGNOSIS — K80.20 GALLSTONES: ICD-10-CM

## 2018-08-03 PROCEDURE — 78227 HEPATOBIL SYST IMAGE W/DRUG: CPT | Mod: 26,,, | Performed by: RADIOLOGY

## 2018-08-03 PROCEDURE — 27202308 NM HEPATOBILIARY(HIDA) WITH PHARM AND EF

## 2018-08-06 ENCOUNTER — TELEPHONE (OUTPATIENT)
Dept: SURGERY | Facility: CLINIC | Age: 68
End: 2018-08-06

## 2018-08-06 ENCOUNTER — PATIENT MESSAGE (OUTPATIENT)
Dept: SURGERY | Facility: CLINIC | Age: 68
End: 2018-08-06

## 2018-08-06 DIAGNOSIS — K43.2 INCISIONAL HERNIA, WITHOUT OBSTRUCTION OR GANGRENE: Primary | ICD-10-CM

## 2018-08-06 NOTE — TELEPHONE ENCOUNTER
I've made several attempts to schedule an appointment for Mr. Senior with no avail. I left a voice message to document the returned call.        ----- Message from Adelaide Rondon sent at 8/6/2018  9:59 AM CDT -----  Contact: Self  Mr. Senior called and left a voicemail on my line asking for someone to call him.  He had his HIDA scan and there was O ejection fraction.  He would like to come in as soon as possible to have this taken care of.  Please call Mr. Senior today.      Thanks, Adelaide

## 2018-08-09 ENCOUNTER — LAB VISIT (OUTPATIENT)
Dept: LAB | Facility: HOSPITAL | Age: 68
End: 2018-08-09
Attending: SPECIALIST
Payer: MEDICARE

## 2018-08-09 DIAGNOSIS — K80.20 BILIARY CALCULUS: ICD-10-CM

## 2018-08-09 DIAGNOSIS — Z12.11 SPECIAL SCREENING FOR MALIGNANT NEOPLASMS, COLON: ICD-10-CM

## 2018-08-09 DIAGNOSIS — R93.2 NONVISUALIZATION OF GALLBLADDER: Primary | ICD-10-CM

## 2018-08-09 LAB
BILIRUB DIRECT SERPL-MCNC: 0.3 MG/DL
BILIRUB SERPL-MCNC: 1 MG/DL

## 2018-08-09 PROCEDURE — 82247 BILIRUBIN TOTAL: CPT

## 2018-08-09 PROCEDURE — 82248 BILIRUBIN DIRECT: CPT

## 2018-08-09 PROCEDURE — 36415 COLL VENOUS BLD VENIPUNCTURE: CPT | Mod: PO

## 2018-08-22 ENCOUNTER — PATIENT MESSAGE (OUTPATIENT)
Dept: SURGERY | Facility: HOSPITAL | Age: 68
End: 2018-08-22

## 2018-08-30 ENCOUNTER — TELEPHONE (OUTPATIENT)
Dept: SURGERY | Facility: CLINIC | Age: 68
End: 2018-08-30

## 2018-09-04 ENCOUNTER — LAB VISIT (OUTPATIENT)
Dept: LAB | Facility: HOSPITAL | Age: 68
End: 2018-09-04
Attending: UROLOGY
Payer: MEDICARE

## 2018-09-04 DIAGNOSIS — C61 PROSTATE CANCER: ICD-10-CM

## 2018-09-04 LAB — COMPLEXED PSA SERPL-MCNC: <0.01 NG/ML

## 2018-09-04 PROCEDURE — 84153 ASSAY OF PSA TOTAL: CPT

## 2018-09-04 PROCEDURE — 36415 COLL VENOUS BLD VENIPUNCTURE: CPT | Mod: PO

## 2018-09-05 PROBLEM — I82.401 ACUTE DEEP VEIN THROMBOSIS (DVT) OF RIGHT LOWER EXTREMITY: Status: RESOLVED | Noted: 2017-10-04 | Resolved: 2018-09-05

## 2018-09-05 PROBLEM — K80.20 GALLSTONES: Status: RESOLVED | Noted: 2018-07-17 | Resolved: 2018-09-05

## 2018-09-06 ENCOUNTER — OFFICE VISIT (OUTPATIENT)
Dept: UROLOGY | Facility: CLINIC | Age: 68
End: 2018-09-06
Payer: MEDICARE

## 2018-09-06 VITALS
RESPIRATION RATE: 16 BRPM | WEIGHT: 220 LBS | DIASTOLIC BLOOD PRESSURE: 76 MMHG | BODY MASS INDEX: 30.8 KG/M2 | HEIGHT: 71 IN | HEART RATE: 69 BPM | SYSTOLIC BLOOD PRESSURE: 127 MMHG

## 2018-09-06 DIAGNOSIS — C61 PROSTATE CANCER: Primary | ICD-10-CM

## 2018-09-06 PROCEDURE — 99999 PR PBB SHADOW E&M-EST. PATIENT-LVL III: CPT | Mod: PBBFAC,,, | Performed by: UROLOGY

## 2018-09-06 PROCEDURE — 99213 OFFICE O/P EST LOW 20 MIN: CPT | Mod: PBBFAC | Performed by: UROLOGY

## 2018-09-06 PROCEDURE — 99214 OFFICE O/P EST MOD 30 MIN: CPT | Mod: S$PBB,,, | Performed by: UROLOGY

## 2018-09-06 NOTE — PROGRESS NOTES
Clinic Note  9/6/2018      Subjective:         Chief Complaint:   HPI  Ryder Senior is a 68 y.o. male with Prostate Cancer; s/p robotic assisted laparoscopic prostatectomy 9/25/2017.  [facility] 5 (3+2); (-) SV,extenson, margins (close to left marigin; pT2, pN0, pMx.  Continent. erectile dysfunction an issue, PEP and Cialis.      Lab Results   Component Value Date    PSADIAG <0.01 09/04/2018    PSADIAG <0.01 03/02/2018    PSADIAG 0.02 11/01/2017      Past Medical History:   Diagnosis Date    a H/O Post-Prostatectomy RLE DVT In 09/2017     [facility] 9/28/17-10/6/17 Stay Included This DX; He Was TXd With Eliquis X 6 Months    b Hypertension     c Mild Hypercholesterolemia With High HDL     Will Monitor    j Cholelithiasis     Dr. Johnnie Schultz    j Family H/O Colon Cancer     His Daughter    j H/O Clostridium Difficile Colitis In 09/2017     [facility] 9/28/17-10/6/17 Stay Included This DX    j H/O Hernia Repair Surgery     j H/O Post-Prostatectomy Ileus In 09/2017     [facility] 9/28/17-10/6/17 Stay Included This DX    j Left Supraumbilical Abdominal Hernia     Dr. Johnnie Schultz    k Erectile Dysfunction After Prostatectomy     Dr. Tyrese Yanes; On Papaverine Penile Injections PRN; Cialis 20 Mg Doesn't Work    k Prostate Cancer S/P Laparoscopic Prostatectomy 09/2017     Dr. Tyrese Yanes    l H/O Arthroscopic Elbow Surgery     l H/O Arthroscopic Knee Surgery     l H/O Carpal Tunnel Release Surgery     m Family H/O Alzheimer's Disease     His Father     Family History   Problem Relation Age of Onset    Prostate cancer Father     Alzheimer's disease Father     Cancer Mother     Prostate cancer Maternal Grandfather      Social History     Socioeconomic History    Marital status:      Spouse name: Not on file    Number of children: Not on file    Years of education: Not on file    Highest education level: Not on file   Social Needs    Financial resource strain: Not on file    Food insecurity -  worry: Not on file    Food insecurity - inability: Not on file    Transportation needs - medical: Not on file    Transportation needs - non-medical: Not on file   Occupational History    Not on file   Tobacco Use    Smoking status: Never Smoker    Smokeless tobacco: Never Used   Substance and Sexual Activity    Alcohol use: Yes     Alcohol/week: 1.2 oz     Types: 2 Cans of beer per week    Drug use: No    Sexual activity: Yes     Partners: Female   Other Topics Concern    Not on file   Social History Narrative    Not on file     Past Surgical History:   Procedure Laterality Date    corpal tunnel  2014    ELBOW ARTHROPLASTY  2012    HERNIA REPAIR      KNEE ARTHROSCOPY  2002    PROSTATECTOMY  09/2017    pelon hidalgo md - [facility]     Patient Active Problem List   Diagnosis    Male urinary stress incontinence    Hypertension    Prostate Cancer S/P Laparoscopic Prostatectomy 09/2017    Erectile Dysfunction After Prostatectomy    Family H/O Alzheimer's Disease    H/O Arthroscopic Knee Surgery    H/O Carpal Tunnel Release Surgery    H/O Arthroscopic Elbow Surgery    H/O Hernia Repair Surgery    Family H/O Colon Cancer    Mild Hypercholesterolemia With High HDL    Incisional hernia, without obstruction or gangrene    Cholelithiasis    Left Supraumbilical Abdominal Hernia     Review of Systems   Constitutional: Negative for appetite change, chills, fatigue, fever and unexpected weight change.   HENT: Negative for nosebleeds.    Respiratory: Negative for shortness of breath and wheezing.    Cardiovascular: Negative for chest pain, palpitations and leg swelling.   Gastrointestinal: Negative for abdominal distention, abdominal pain, constipation, diarrhea, nausea and vomiting.   Genitourinary: Negative for dysuria and hematuria.   Musculoskeletal: Negative for arthralgias and back pain.   Skin: Negative for pallor.   Neurological: Negative for dizziness, seizures and syncope.  "  Hematological: Negative for adenopathy.   Psychiatric/Behavioral: Negative for dysphoric mood.         Objective:      There were no vitals taken for this visit.  Estimated body mass index is 31.24 kg/m² as calculated from the following:    Height as of 9/5/18: 5' 11" (1.803 m).    Weight as of 9/5/18: 101.6 kg (224 lb).  Physical Exam   Constitutional: He is oriented to person, place, and time. He appears well-developed and well-nourished. No distress.   HENT:   Head: Atraumatic.   Neck: No tracheal deviation present.   Cardiovascular: Normal rate.    Pulmonary/Chest: Effort normal. No respiratory distress. He has no wheezes.   Abdominal: Soft. Bowel sounds are normal. He exhibits no distension and no mass. There is no tenderness. There is no rebound and no guarding.   Neurological: He is alert and oriented to person, place, and time.   Skin: Skin is warm and dry. He is not diaphoretic.     Psychiatric: He has a normal mood and affect. His behavior is normal. Judgment and thought content normal.         Assessment and Plan:           Problem List Items Addressed This Visit     Prostate Cancer S/P Laparoscopic Prostatectomy 09/2017 - Primary          Follow up:     6 months with PSA.  Tyrese Yanes        "

## 2018-12-06 ENCOUNTER — PATIENT MESSAGE (OUTPATIENT)
Dept: SURGERY | Facility: CLINIC | Age: 68
End: 2018-12-06

## 2018-12-18 ENCOUNTER — HOSPITAL ENCOUNTER (OUTPATIENT)
Dept: CARDIOLOGY | Facility: CLINIC | Age: 68
Discharge: HOME | End: 2018-12-18
Payer: MEDICARE

## 2018-12-18 ENCOUNTER — OFFICE VISIT (OUTPATIENT)
Dept: SURGERY | Facility: CLINIC | Age: 68
End: 2018-12-18
Payer: MEDICARE

## 2018-12-18 VITALS
WEIGHT: 226 LBS | DIASTOLIC BLOOD PRESSURE: 76 MMHG | BODY MASS INDEX: 31.64 KG/M2 | HEIGHT: 71 IN | SYSTOLIC BLOOD PRESSURE: 123 MMHG | HEART RATE: 76 BPM

## 2018-12-18 DIAGNOSIS — N39.3 MALE URINARY STRESS INCONTINENCE: ICD-10-CM

## 2018-12-18 DIAGNOSIS — K80.20 SYMPTOMATIC CHOLELITHIASIS: ICD-10-CM

## 2018-12-18 DIAGNOSIS — K43.2 INCISIONAL HERNIA, WITHOUT OBSTRUCTION OR GANGRENE: ICD-10-CM

## 2018-12-18 DIAGNOSIS — K46.9 ABDOMINAL HERNIA WITHOUT OBSTRUCTION AND WITHOUT GANGRENE, RECURRENCE NOT SPECIFIED, UNSPECIFIED HERNIA TYPE: Primary | ICD-10-CM

## 2018-12-18 PROCEDURE — 93010 ELECTROCARDIOGRAM REPORT: CPT | Mod: S$PBB,,, | Performed by: INTERNAL MEDICINE

## 2018-12-18 PROCEDURE — 99999 PR PBB SHADOW E&M-EST. PATIENT-LVL III: CPT | Mod: PBBFAC,,, | Performed by: SURGERY

## 2018-12-18 PROCEDURE — 93005 ELECTROCARDIOGRAM TRACING: CPT | Mod: PBBFAC | Performed by: INTERNAL MEDICINE

## 2018-12-18 PROCEDURE — 99213 OFFICE O/P EST LOW 20 MIN: CPT | Mod: S$PBB,,, | Performed by: SURGERY

## 2018-12-18 PROCEDURE — 99213 OFFICE O/P EST LOW 20 MIN: CPT | Mod: PBBFAC,25 | Performed by: SURGERY

## 2018-12-18 NOTE — PROGRESS NOTES
I have seen the patient, reviewed the Resident's history and physical, assessment and plan. I have personally interviewed and examined the patient at bedside and: agree with the findings.     For lap evan with open incisional hernia repair without mesh (above and to the left of the incision).  Try entering through the hernia.  He had ileus and dvt after his prostate surgery so will keep him 23 hours and use heparin preop.

## 2018-12-18 NOTE — PROGRESS NOTES
History & Physical    SUBJECTIVE:     History of Present Illness:  Patient is a 68 y.o. male presents with gallstones and epigastric/incisional hernia.  He is s/p robotic prostatectomy Sept 2017.  He developed a mass around his umbilicus in May 2018. It is symptomatic and causes pain. No changes in bowel habits. Last visit, he endorsed epigastric pain with eating and has since undergone HIDA that showed ejection fraction of 0%.    Today he states that he would rather have his hernia repaired with mesh first (he read about mesh), since it is causing the most amount of pain.     Additionally he states he was seen by a GI specialist on the [place] and he underwent an EGD which showed a lower esophageal stricture. (We do not have these records)    Uses marijuana daily (has a medical marijuana clearance, states he is trying to wean off Xanax - usually uses it at night to sleep). Does not use tobacco or alcohol. Of note, his prostatectomy was c/b prolonged ileus, c. Diff infection and bilateral lower extremity DVTs for which he took OAC for 3 months. He has hx open right inguinal hernia repair with mesh in 2013.    Chief Complaint   Patient presents with    Hernia       Review of patient's allergies indicates:   Allergen Reactions    Coconut Hives    Pineapple Hives       Current Outpatient Medications   Medication Sig Dispense Refill    acetaminophen (TYLENOL) 325 MG tablet Take 325 mg by mouth every 6 (six) hours as needed for Pain.      ALPRAZolam (XANAX) 0.25 MG tablet Take 1 tablet (0.25 mg total) by mouth nightly as needed for Anxiety. 30 tablet 1    cyclobenzaprine (FLEXERIL) 10 MG tablet Take 1 tablet (10 mg total) by mouth every evening. 30 tablet 5    GREEN TEA EXTRACT ORAL Take by mouth.      insulin syringe-needle U-100 0.3 mL 31 gauge x 5/16 Syrg Use as directed with ICI Therapy 10 each 12    ketoconazole (NIZORAL) 2 % cream Apply topically 2 (two) times daily. 15 g 3    losartan (COZAAR) 50 MG  "tablet Take 0.5-1 tablets (25-50 mg total) by mouth once daily. Patient takes 0.5 to 1 whole tablet at night (Patient taking differently: Take 50 mg by mouth once daily. ) 90 tablet 3    magnesium oxide (MAG-OX) 400 mg tablet Take 1 tablet (400 mg total) by mouth once daily.  0    multivitamin capsule Take 1 capsule by mouth once daily.      nystatin (MYCOSTATIN) cream APPLY TOPICALLY TO AFFECTED AREA BID  1    papaverine 30 mg/mL injection Add Phentolamine 1 mg/cc  Add PGE1 10 mcg/cc 5 mL 11    sildenafil (REVATIO) 20 mg Tab Take 2-5 tablets ( mg total) by mouth daily as needed. 30 tablet 11    tadalafil (CIALIS) 20 MG Tab Take 1 tablet (20 mg total) by mouth daily as needed. 10 tablet 11     No current facility-administered medications for this visit.        Past Medical History:   Diagnosis Date    a H/O Post-Prostatectomy RLE DVT In 09/2017     [facility] 9/28/17-10/6/17 Stay Included This DX; He Was TXd With Eliquis X 6 Months    b Hypertension     c Mild Hypercholesterolemia With High HDL     Will Monitor    j Cholelithiasis     Dr. Aime Greenwood: "08/2018 RUQ ABD U/S And HIDA Scan Positive, But Asymptomatic;" Dr. Johnnie porter Family H/O Colon Cancer     Dr. Aime Greenwood On 8/29/18 OV Note: "Repeat TC In 1 Year;" His Daughter    j H. Pylori Negative Gastritis     Dr. Aime Greenwood; 08/2018 EGD = H. Pylori Negative Gastritis, But Otherwise Normal    j H/O Clostridium Difficile Colitis In 09/2017     [facility] 9/28/17-10/6/17 Stay Included This DX    j H/O Hernia Repair Surgery     j H/O Post-Prostatectomy Ileus In 09/2017     [facility] 9/28/17-10/6/17 Stay Included This DX    j Left Supraumbilical Abdominal Hernia     Dr. Johnnie lynch Erectile Dysfunction After Prostatectomy     Dr. Tyrese Yanes; On Papaverine Penile Injections PRN; Cialis 20 Mg Doesn't Work    k Prostate Cancer S/P Laparoscopic Prostatectomy 09/2017     Dr. Tyrese Yanes    l H/O Arthroscopic Elbow " "Surgery     l H/O Arthroscopic Knee Surgery     l H/O Carpal Tunnel Release Surgery     m Family H/O Alzheimer's Disease     His Father     Past Surgical History:   Procedure Laterality Date    corpal tunnel  2014    ELBOW ARTHROPLASTY  2012    HERNIA REPAIR  2013    [facility] - right inguinal    KNEE ARTHROSCOPY  2002    PROSTATECTOMY  09/2017    [facility]    ROBOTIC ASSISTED LAPAROSCOPIC PROSTATECTOMY N/A 9/25/2017    Performed by Tyrese Yanes [facility] at [facility] [address] [place] [address]     Family History   Problem Relation Age of Onset    Prostate cancer Father     Alzheimer's disease Father     Cancer Mother     Prostate cancer Maternal Grandfather      Social History     Tobacco Use    Smoking status: Never Smoker    Smokeless tobacco: Never Used   Substance Use Topics    Alcohol use: Yes     Alcohol/week: 1.2 oz     Types: 2 Cans of beer per week    Drug use: No        Review of Systems:  Review of Systems   Constitutional: Negative for fever and unexpected weight change.   Respiratory: Negative for cough, chest tightness and shortness of breath.    Cardiovascular: Negative for chest pain and leg swelling.   Gastrointestinal: Negative for constipation, nausea and vomiting.   Genitourinary: Negative for difficulty urinating and dysuria.   Skin: Negative for rash and wound.   Neurological: Negative for seizures and headaches.   Hematological: Does not bruise/bleed easily.       OBJECTIVE:     Vital Signs (Most Recent)  Pulse: 76 (12/18/18 1105)  BP: 123/76 (12/18/18 1105)  5' 11" (1.803 m)  102.5 kg (226 lb)     Physical Exam:  Physical Exam   Constitutional: He is oriented to person, place, and time. He appears well-developed and well-nourished.   Neck: Normal range of motion. Neck supple.   Cardiovascular: Normal rate and regular rhythm.   Pulmonary/Chest: Effort normal.   Abdominal: Soft. Normal appearance and bowel sounds are normal. A hernia is present.       Neurological: He is " alert and oriented to person, place, and time.   Skin: Skin is warm and dry.   Psychiatric: He has a normal mood and affect. His behavior is normal. Judgment and thought content normal.   Vitals reviewed.      Laboratory  CBC: Reviewed  CMP: Reviewed    Diagnostic Results:     HIDA reviewed: EF 0%    ASSESSMENT/PLAN:     67 yo male with cholelithiasis and HIDA with EF 0% as well as an incisional hernia at the umbilicus.    PLAN:    Discussed that the likelihood of recurrent GB symptoms is very high considering his prior symptoms and imaging findings. We recommend laparoscopic cholecystectomy with concomitant repair (primary) of the incisional defect at the umbilicus.

## 2018-12-18 NOTE — LETTER
[facility] - General Surgery  [address]  [facility] [address]  Phone: [phone] December 18, 2018      Wilfrid Patel MD  [address] [place] [address] Dr Melyssa [place] [address]    Patient: Ryder Senior   MR Number: [id number]   YOB: 1950   Date of Visit: 12/18/2018     Dear Dr. Patel:    Thank you for referring Ryder Senior to me for evaluation. Attached you will find relevant portions of my assessment and plan of care.    Mr. Senior is scheduled for laparoscopic cholecystectomy with open incisional hernia repair without mesh (above and to the left of the incision).      We will try entering through the hernia.  He had Ileus and DVT after his prostate surgery, so will keep him 23 hours and use heparin during pre-op.    If you have questions, please do not hesitate to call me. I look forward to following Ryder Senior along with you.    Sincerely,        Johnnie Schultz MD  Section Head - General, Laparoscopic, Bariatric  Acute Care and Oncologic Surgery   - Surgical Weight Loss Program  [facility]    WSR/afw

## 2019-01-24 ENCOUNTER — TELEPHONE (OUTPATIENT)
Dept: SURGERY | Facility: CLINIC | Age: 69
End: 2019-01-24

## 2019-01-24 NOTE — TELEPHONE ENCOUNTER
----- Message from Lana Espinal sent at 1/24/2019  9:00 AM CST -----  Contact: Patient   Needs Advice    Reason for call: Patient has questions concerning pre op information (cleansing drink?) and arrival time         Communication Preference: [phone]    Additional Information: please contact the patient to consult them

## 2019-01-25 ENCOUNTER — ANESTHESIA (OUTPATIENT)
Dept: SURGERY | Facility: HOSPITAL | Age: 69
End: 2019-01-25
Payer: MEDICARE

## 2019-01-25 ENCOUNTER — ANESTHESIA EVENT (OUTPATIENT)
Dept: SURGERY | Facility: HOSPITAL | Age: 69
End: 2019-01-25
Payer: MEDICARE

## 2019-01-25 ENCOUNTER — HOSPITAL ENCOUNTER (OUTPATIENT)
Facility: HOSPITAL | Age: 69
LOS: 1 days | Discharge: HOME | End: 2019-01-26
Attending: SURGERY | Admitting: SURGERY
Payer: MEDICARE

## 2019-01-25 DIAGNOSIS — K80.20 CALCULUS OF GALLBLADDER WITHOUT CHOLECYSTITIS WITHOUT OBSTRUCTION: ICD-10-CM

## 2019-01-25 DIAGNOSIS — K80.20 GALLSTONES: Primary | ICD-10-CM

## 2019-01-25 DIAGNOSIS — K43.2 INCISIONAL HERNIA, WITHOUT OBSTRUCTION OR GANGRENE: ICD-10-CM

## 2019-01-25 PROCEDURE — 47562 LAPAROSCOPIC CHOLECYSTECTOMY: CPT | Mod: GC,,, | Performed by: SURGERY

## 2019-01-25 PROCEDURE — 88302 TISSUE SPECIMEN TO PATHOLOGY - SURGERY: ICD-10-PCS | Mod: 26,,, | Performed by: PATHOLOGY

## 2019-01-25 PROCEDURE — 25000003 PHARM REV CODE 250: Performed by: STUDENT IN AN ORGANIZED HEALTH CARE EDUCATION/TRAINING PROGRAM

## 2019-01-25 PROCEDURE — 71000039 HC RECOVERY, EACH ADD'L HOUR: Performed by: SURGERY

## 2019-01-25 PROCEDURE — 88304 TISSUE SPECIMEN TO PATHOLOGY - SURGERY: ICD-10-PCS | Mod: 26,,, | Performed by: PATHOLOGY

## 2019-01-25 PROCEDURE — 49585 PR REPAIR UMBILICAL HERN,5+Y/O,REDUC: ICD-10-PCS | Mod: 51,GC,, | Performed by: SURGERY

## 2019-01-25 PROCEDURE — 37000009 HC ANESTHESIA EA ADD 15 MINS: Performed by: SURGERY

## 2019-01-25 PROCEDURE — 88304 TISSUE EXAM BY PATHOLOGIST: CPT | Performed by: PATHOLOGY

## 2019-01-25 PROCEDURE — 94761 N-INVAS EAR/PLS OXIMETRY MLT: CPT

## 2019-01-25 PROCEDURE — D9220A PRA ANESTHESIA: ICD-10-PCS | Mod: ANES,,, | Performed by: ANESTHESIOLOGY

## 2019-01-25 PROCEDURE — 71000033 HC RECOVERY, INTIAL HOUR: Performed by: SURGERY

## 2019-01-25 PROCEDURE — 36000709 HC OR TIME LEV III EA ADD 15 MIN: Performed by: SURGERY

## 2019-01-25 PROCEDURE — 63600175 PHARM REV CODE 636 W HCPCS: Performed by: SURGERY

## 2019-01-25 PROCEDURE — D9220A PRA ANESTHESIA: ICD-10-PCS | Mod: CRNA,,, | Performed by: NURSE ANESTHETIST, CERTIFIED REGISTERED

## 2019-01-25 PROCEDURE — D9220A PRA ANESTHESIA: Mod: CRNA,,, | Performed by: NURSE ANESTHETIST, CERTIFIED REGISTERED

## 2019-01-25 PROCEDURE — 37000008 HC ANESTHESIA 1ST 15 MINUTES: Performed by: SURGERY

## 2019-01-25 PROCEDURE — 47562 PR LAP,CHOLECYSTECTOMY: ICD-10-PCS | Mod: GC,,, | Performed by: SURGERY

## 2019-01-25 PROCEDURE — 63600175 PHARM REV CODE 636 W HCPCS: Performed by: STUDENT IN AN ORGANIZED HEALTH CARE EDUCATION/TRAINING PROGRAM

## 2019-01-25 PROCEDURE — D9220A PRA ANESTHESIA: Mod: ANES,,, | Performed by: ANESTHESIOLOGY

## 2019-01-25 PROCEDURE — 36000708 HC OR TIME LEV III 1ST 15 MIN: Performed by: SURGERY

## 2019-01-25 PROCEDURE — 11000001 HC ACUTE MED/SURG PRIVATE ROOM

## 2019-01-25 PROCEDURE — 25000003 PHARM REV CODE 250: Performed by: NURSE ANESTHETIST, CERTIFIED REGISTERED

## 2019-01-25 PROCEDURE — 63600175 PHARM REV CODE 636 W HCPCS: Performed by: NURSE ANESTHETIST, CERTIFIED REGISTERED

## 2019-01-25 PROCEDURE — 88302 TISSUE EXAM BY PATHOLOGIST: CPT | Mod: 26,,, | Performed by: PATHOLOGY

## 2019-01-25 PROCEDURE — 63600175 PHARM REV CODE 636 W HCPCS: Performed by: ANESTHESIOLOGY

## 2019-01-25 PROCEDURE — 27201423 OPTIME MED/SURG SUP & DEVICES STERILE SUPPLY: Performed by: SURGERY

## 2019-01-25 PROCEDURE — C1729 CATH, DRAINAGE: HCPCS | Performed by: SURGERY

## 2019-01-25 PROCEDURE — 63600175 PHARM REV CODE 636 W HCPCS

## 2019-01-25 PROCEDURE — 27000221 HC OXYGEN, UP TO 24 HOURS

## 2019-01-25 PROCEDURE — 49585 PR REPAIR UMBILICAL HERN,5+Y/O,REDUC: CPT | Mod: 51,GC,, | Performed by: SURGERY

## 2019-01-25 PROCEDURE — 25000003 PHARM REV CODE 250: Performed by: SURGERY

## 2019-01-25 RX ORDER — ENOXAPARIN SODIUM 100 MG/ML
40 INJECTION SUBCUTANEOUS EVERY 24 HOURS
Status: DISCONTINUED | OUTPATIENT
Start: 2019-01-25 | End: 2019-01-26 | Stop reason: HOSPADM

## 2019-01-25 RX ORDER — LOSARTAN POTASSIUM 25 MG/1
50 TABLET ORAL DAILY
Status: DISCONTINUED | OUTPATIENT
Start: 2019-01-25 | End: 2019-01-26 | Stop reason: HOSPADM

## 2019-01-25 RX ORDER — HYDROMORPHONE HYDROCHLORIDE 1 MG/ML
INJECTION, SOLUTION INTRAMUSCULAR; INTRAVENOUS; SUBCUTANEOUS
Status: COMPLETED
Start: 2019-01-25 | End: 2019-01-25

## 2019-01-25 RX ORDER — HYDROCODONE BITARTRATE AND ACETAMINOPHEN 10; 325 MG/1; MG/1
1 TABLET ORAL EVERY 4 HOURS PRN
Status: DISCONTINUED | OUTPATIENT
Start: 2019-01-25 | End: 2019-01-26 | Stop reason: HOSPADM

## 2019-01-25 RX ORDER — HYDROMORPHONE HYDROCHLORIDE 1 MG/ML
0.2 INJECTION, SOLUTION INTRAMUSCULAR; INTRAVENOUS; SUBCUTANEOUS EVERY 5 MIN PRN
Status: COMPLETED | OUTPATIENT
Start: 2019-01-25 | End: 2019-01-25

## 2019-01-25 RX ORDER — NEOSTIGMINE METHYLSULFATE 1 MG/ML
INJECTION, SOLUTION INTRAVENOUS
Status: DISCONTINUED | OUTPATIENT
Start: 2019-01-25 | End: 2019-01-25

## 2019-01-25 RX ORDER — MIDAZOLAM HYDROCHLORIDE 1 MG/ML
INJECTION, SOLUTION INTRAMUSCULAR; INTRAVENOUS
Status: DISCONTINUED | OUTPATIENT
Start: 2019-01-25 | End: 2019-01-25

## 2019-01-25 RX ORDER — FENTANYL CITRATE 50 UG/ML
INJECTION, SOLUTION INTRAMUSCULAR; INTRAVENOUS
Status: DISCONTINUED | OUTPATIENT
Start: 2019-01-25 | End: 2019-01-25

## 2019-01-25 RX ORDER — HYDROMORPHONE HYDROCHLORIDE 1 MG/ML
INJECTION, SOLUTION INTRAMUSCULAR; INTRAVENOUS; SUBCUTANEOUS
Status: DISPENSED
Start: 2019-01-25 | End: 2019-01-26

## 2019-01-25 RX ORDER — ONDANSETRON 8 MG/1
8 TABLET, ORALLY DISINTEGRATING ORAL EVERY 8 HOURS PRN
Status: DISCONTINUED | OUTPATIENT
Start: 2019-01-25 | End: 2019-01-26 | Stop reason: HOSPADM

## 2019-01-25 RX ORDER — SODIUM CHLORIDE 0.9 % (FLUSH) 0.9 %
3 SYRINGE (ML) INJECTION
Status: DISCONTINUED | OUTPATIENT
Start: 2019-01-25 | End: 2019-01-25 | Stop reason: HOSPADM

## 2019-01-25 RX ORDER — OXYCODONE AND ACETAMINOPHEN 5; 325 MG/1; MG/1
1 TABLET ORAL EVERY 4 HOURS PRN
Qty: 30 TABLET | Refills: 0 | Status: SHIPPED | OUTPATIENT
Start: 2019-01-25 | End: 2019-01-25 | Stop reason: SDUPTHER

## 2019-01-25 RX ORDER — EPHEDRINE SULFATE 50 MG/ML
INJECTION, SOLUTION INTRAVENOUS
Status: DISCONTINUED | OUTPATIENT
Start: 2019-01-25 | End: 2019-01-25

## 2019-01-25 RX ORDER — PHENYLEPHRINE HYDROCHLORIDE 10 MG/ML
INJECTION INTRAVENOUS
Status: DISCONTINUED | OUTPATIENT
Start: 2019-01-25 | End: 2019-01-25

## 2019-01-25 RX ORDER — SODIUM CHLORIDE 9 MG/ML
INJECTION, SOLUTION INTRAVENOUS CONTINUOUS
Status: DISCONTINUED | OUTPATIENT
Start: 2019-01-25 | End: 2019-01-25

## 2019-01-25 RX ORDER — CEFAZOLIN SODIUM 1 G/3ML
2 INJECTION, POWDER, FOR SOLUTION INTRAMUSCULAR; INTRAVENOUS
Status: COMPLETED | OUTPATIENT
Start: 2019-01-25 | End: 2019-01-25

## 2019-01-25 RX ORDER — GLYCOPYRROLATE 0.2 MG/ML
INJECTION INTRAMUSCULAR; INTRAVENOUS
Status: DISCONTINUED | OUTPATIENT
Start: 2019-01-25 | End: 2019-01-25

## 2019-01-25 RX ORDER — LIDOCAINE HYDROCHLORIDE 10 MG/ML
1 INJECTION, SOLUTION EPIDURAL; INFILTRATION; INTRACAUDAL; PERINEURAL ONCE
Status: DISCONTINUED | OUTPATIENT
Start: 2019-01-25 | End: 2019-01-26 | Stop reason: HOSPADM

## 2019-01-25 RX ORDER — HYDROCODONE BITARTRATE AND ACETAMINOPHEN 10; 325 MG/1; MG/1
TABLET ORAL
Status: DISPENSED
Start: 2019-01-25 | End: 2019-01-26

## 2019-01-25 RX ORDER — ONDANSETRON 2 MG/ML
INJECTION INTRAMUSCULAR; INTRAVENOUS
Status: DISCONTINUED | OUTPATIENT
Start: 2019-01-25 | End: 2019-01-25

## 2019-01-25 RX ORDER — ALPRAZOLAM 0.25 MG/1
0.25 TABLET ORAL NIGHTLY PRN
Status: DISCONTINUED | OUTPATIENT
Start: 2019-01-25 | End: 2019-01-26 | Stop reason: HOSPADM

## 2019-01-25 RX ORDER — ROCURONIUM BROMIDE 10 MG/ML
INJECTION, SOLUTION INTRAVENOUS
Status: DISCONTINUED | OUTPATIENT
Start: 2019-01-25 | End: 2019-01-25

## 2019-01-25 RX ORDER — BUPIVACAINE HYDROCHLORIDE 2.5 MG/ML
INJECTION, SOLUTION EPIDURAL; INFILTRATION; INTRACAUDAL
Status: DISCONTINUED | OUTPATIENT
Start: 2019-01-25 | End: 2019-01-25 | Stop reason: HOSPADM

## 2019-01-25 RX ORDER — HYDROCODONE BITARTRATE AND ACETAMINOPHEN 5; 325 MG/1; MG/1
1 TABLET ORAL EVERY 4 HOURS PRN
Status: DISCONTINUED | OUTPATIENT
Start: 2019-01-25 | End: 2019-01-26 | Stop reason: HOSPADM

## 2019-01-25 RX ORDER — ACETAMINOPHEN 10 MG/ML
INJECTION, SOLUTION INTRAVENOUS
Status: DISCONTINUED | OUTPATIENT
Start: 2019-01-25 | End: 2019-01-25

## 2019-01-25 RX ORDER — PROPOFOL 10 MG/ML
VIAL (ML) INTRAVENOUS
Status: DISCONTINUED | OUTPATIENT
Start: 2019-01-25 | End: 2019-01-25

## 2019-01-25 RX ORDER — LIDOCAINE HCL/PF 100 MG/5ML
SYRINGE (ML) INTRAVENOUS
Status: DISCONTINUED | OUTPATIENT
Start: 2019-01-25 | End: 2019-01-25

## 2019-01-25 RX ORDER — LIDOCAINE HYDROCHLORIDE 10 MG/ML
1 INJECTION, SOLUTION EPIDURAL; INFILTRATION; INTRACAUDAL; PERINEURAL ONCE
Status: DISCONTINUED | OUTPATIENT
Start: 2019-01-25 | End: 2019-01-25

## 2019-01-25 RX ORDER — OXYCODONE AND ACETAMINOPHEN 5; 325 MG/1; MG/1
1 TABLET ORAL EVERY 4 HOURS PRN
Qty: 30 TABLET | Refills: 0 | Status: SHIPPED | OUTPATIENT
Start: 2019-01-25 | End: 2019-07-19

## 2019-01-25 RX ADMIN — HYDROMORPHONE HYDROCHLORIDE 0.2 MG: 1 INJECTION, SOLUTION INTRAMUSCULAR; INTRAVENOUS; SUBCUTANEOUS at 01:01

## 2019-01-25 RX ADMIN — SODIUM CHLORIDE: 0.9 INJECTION, SOLUTION INTRAVENOUS at 08:01

## 2019-01-25 RX ADMIN — HYDROMORPHONE HYDROCHLORIDE 0.2 MG: 1 INJECTION, SOLUTION INTRAMUSCULAR; INTRAVENOUS; SUBCUTANEOUS at 12:01

## 2019-01-25 RX ADMIN — PHENYLEPHRINE HYDROCHLORIDE 100 MCG: 10 INJECTION INTRAVENOUS at 10:01

## 2019-01-25 RX ADMIN — GLYCOPYRROLATE 0.6 MG: 0.2 INJECTION, SOLUTION INTRAMUSCULAR; INTRAVENOUS at 11:01

## 2019-01-25 RX ADMIN — FENTANYL CITRATE 25 MCG: 50 INJECTION, SOLUTION INTRAMUSCULAR; INTRAVENOUS at 11:01

## 2019-01-25 RX ADMIN — SODIUM CHLORIDE, SODIUM GLUCONATE, SODIUM ACETATE, POTASSIUM CHLORIDE, MAGNESIUM CHLORIDE, SODIUM PHOSPHATE, DIBASIC, AND POTASSIUM PHOSPHATE: .53; .5; .37; .037; .03; .012; .00082 INJECTION, SOLUTION INTRAVENOUS at 10:01

## 2019-01-25 RX ADMIN — HYDROCODONE BITARTRATE AND ACETAMINOPHEN 1 TABLET: 10; 325 TABLET ORAL at 12:01

## 2019-01-25 RX ADMIN — FENTANYL CITRATE 100 MCG: 50 INJECTION, SOLUTION INTRAMUSCULAR; INTRAVENOUS at 10:01

## 2019-01-25 RX ADMIN — NEOSTIGMINE METHYLSULFATE 5 MG: 1 INJECTION INTRAVENOUS at 11:01

## 2019-01-25 RX ADMIN — EPHEDRINE SULFATE 5 MG: 50 INJECTION, SOLUTION INTRAMUSCULAR; INTRAVENOUS; SUBCUTANEOUS at 10:01

## 2019-01-25 RX ADMIN — MIDAZOLAM HYDROCHLORIDE 2 MG: 1 INJECTION, SOLUTION INTRAMUSCULAR; INTRAVENOUS at 10:01

## 2019-01-25 RX ADMIN — ONDANSETRON 4 MG: 2 INJECTION INTRAMUSCULAR; INTRAVENOUS at 11:01

## 2019-01-25 RX ADMIN — HYDROCODONE BITARTRATE AND ACETAMINOPHEN 1 TABLET: 10; 325 TABLET ORAL at 10:01

## 2019-01-25 RX ADMIN — ACETAMINOPHEN 1000 MG: 10 INJECTION, SOLUTION INTRAVENOUS at 10:01

## 2019-01-25 RX ADMIN — HYDROCODONE BITARTRATE AND ACETAMINOPHEN 1 TABLET: 10; 325 TABLET ORAL at 05:01

## 2019-01-25 RX ADMIN — HYDROMORPHONE HYDROCHLORIDE 0.2 MG: 1 INJECTION, SOLUTION INTRAMUSCULAR; INTRAVENOUS; SUBCUTANEOUS at 02:01

## 2019-01-25 RX ADMIN — EPHEDRINE SULFATE 10 MG: 50 INJECTION, SOLUTION INTRAMUSCULAR; INTRAVENOUS; SUBCUTANEOUS at 10:01

## 2019-01-25 RX ADMIN — PROPOFOL 70 MG: 10 INJECTION, EMULSION INTRAVENOUS at 11:01

## 2019-01-25 RX ADMIN — PROPOFOL 200 MG: 10 INJECTION, EMULSION INTRAVENOUS at 10:01

## 2019-01-25 RX ADMIN — FENTANYL CITRATE 50 MCG: 50 INJECTION, SOLUTION INTRAMUSCULAR; INTRAVENOUS at 11:01

## 2019-01-25 RX ADMIN — ROCURONIUM BROMIDE 50 MG: 10 INJECTION, SOLUTION INTRAVENOUS at 10:01

## 2019-01-25 RX ADMIN — CEFAZOLIN 2 G: 330 INJECTION, POWDER, FOR SOLUTION INTRAMUSCULAR; INTRAVENOUS at 10:01

## 2019-01-25 RX ADMIN — ENOXAPARIN SODIUM 40 MG: 100 INJECTION SUBCUTANEOUS at 05:01

## 2019-01-25 RX ADMIN — LIDOCAINE HYDROCHLORIDE 100 MG: 20 INJECTION, SOLUTION INTRAVENOUS at 10:01

## 2019-01-25 NOTE — TRANSFER OF CARE
"Anesthesia Transfer of Care Note    Patient: Ryder Senior    Procedure(s) Performed: Procedure(s) (LRB):  REPAIR, HERNIA, INCISIONAL OR VENTRAL, OPEN w/ out mesh (N/A)  CHOLECYSTECTOMY, LAPAROSCOPIC (N/A)    Patient location: PACU    Anesthesia Type: general    Transport from OR: Transported from OR on 6-10 L/min O2 by face mask with adequate spontaneous ventilation    Post pain: adequate analgesia    Post assessment: no apparent anesthetic complications    Post vital signs: stable    Level of consciousness: awake and alert    Nausea/Vomiting: no nausea/vomiting    Complications: none    Transfer of care protocol was followed      Last vitals:   Visit Vitals  /63 (BP Location: Left arm, Patient Position: Lying)   Pulse 71   Temp 36.6 °C (97.9 °F) (Oral)   Resp 18   Ht 5' 11" (1.803 m)   Wt 99.3 kg (219 lb)   SpO2 100%   BMI 30.54 kg/m²     "

## 2019-01-25 NOTE — NURSING TRANSFER
Nursing Transfer Note      1/25/2019     Transfer To: [id number]    Transfer via stretcher    Transported by PCT    Medicines sent: N/A    Chart send with patient: Yes    Notified: spouse    Patient reassessed at: 1/25/19 @ 0165

## 2019-01-25 NOTE — H&P
"History of Present Illness:  Patient is a 68 y.o. male presents with gallstones and epigastric/incisional hernia.  He is s/p robotic prostatectomy Sept 2017.  He developed a mass around his umbilicus in May 2018. It is symptomatic and causes pain. No changes in bowel habits. Last visit, he endorsed epigastric pain with eating and has since undergone HIDA that showed ejection fraction of 0%.    He has had no further pain from his gallbladder from when I last saw him.    Past Medical History:   Diagnosis Date    a H/O Post-Prostatectomy RLE DVT In 09/2017     [facility] 9/28/17-10/6/17 Stay Included This DX; He Was TXd With Eliquis X 6 Months    b Hypertension     c Mild Hypercholesterolemia With High HDL     Will Monitor    j Cholelithiasis 1/25/19    Dr. Johnnie Schultz With Perform A Laparoscopic Cholecystectomy And Incisional Hernia Repair On 1/25/19    j Family H/O Colon Cancer     Dr. Aime Greenwood On 8/29/18 OV Note: "Repeat TC In 1 Year;" His Daughter    j H. Pylori Negative Gastritis     Dr. Aime Greenwood; 08/2018 EGD = H. Pylori Negative Gastritis, But Otherwise Normal    j H/O Clostridium Difficile Colitis In 09/2017     [facility] 9/28/17-10/6/17 Stay Included This DX    j H/O Hernia Repair Surgery     j H/O Post-Prostatectomy Ileus In 09/2017     [facility] 9/28/17-10/6/17 Stay Included This DX    j Left Supraumbilical Incisional Hernia 1/25/19    Dr. Johnnie Schultz With Perform A Laparoscopic Cholecystectomy And Incisional Hernia Repair On 1/25/19    k Erectile Dysfunction After Prostatectomy     Dr. Tyrese Yanes; On Papaverine Penile Injections PRN; Cialis 20 Mg Doesn't Work    k Prostate Cancer S/P Laparoscopic Prostatectomy 09/2017     Dr. Tyrese Yanes    l H/O Arthroscopic Elbow Surgery     l H/O Arthroscopic Knee Surgery     l H/O Carpal Tunnel Release Surgery     m Family H/O Alzheimer's Disease     His Father       Past Surgical History:   Procedure Laterality Date    corpal tunnel "  2014    ELBOW ARTHROPLASTY  2012    HERNIA REPAIR  2013    [facility] - right inguinal    KNEE ARTHROSCOPY  2002    PROSTATECTOMY  09/2017    [facility]    ROBOTIC ASSISTED LAPAROSCOPIC PROSTATECTOMY N/A 9/25/2017    Performed by [facility] at [facility] OR [address] [place] [address]       Family History   Problem Relation Age of Onset    Prostate cancer Father     Alzheimer's disease Father     Cancer Mother     Prostate cancer Maternal Grandfather        Social History     Socioeconomic History    Marital status:      Spouse name: None    Number of children: None    Years of education: None    Highest education level: None   Social Needs    Financial resource strain: None    Food insecurity - worry: None    Food insecurity - inability: None    Transportation needs - medical: None    Transportation needs - non-medical: None   Occupational History    None   Tobacco Use    Smoking status: Never Smoker    Smokeless tobacco: Never Used   Substance and Sexual Activity    Alcohol use: Yes     Comment: very seldom    Drug use: Yes     Types: Marijuana     Comment: occasional    Sexual activity: Yes     Partners: Female   Other Topics Concern    None   Social History Narrative    None       Current Facility-Administered Medications   Medication Dose Route Frequency Provider Last Rate Last Dose    0.9%  NaCl infusion   Intravenous Continuous Johnnie Schultz MD 70 mL/hr at 01/25/19 0844      ceFAZolin injection 2 g  2 g Intravenous On Call Procedure Johnnie Schultz MD        lidocaine (PF) 10 mg/ml (1%) injection 10 mg  1 mL Intradermal Once Johnnie Schultz MD           Review of patient's allergies indicates:   Allergen Reactions    Coconut Hives    Pineapple Hives       PE: chest clear heart rrr abdomen benign    Plan:  Lap evan with incisional hernia repair

## 2019-01-25 NOTE — ANESTHESIA PREPROCEDURE EVALUATION
01/25/2019  Ryder Senior is a 68 y.o., male.    Anesthesia Evaluation    I have reviewed the Patient Summary Reports.     I have reviewed the Medications.     Review of Systems  Anesthesia Hx:  Denies Hx of Anesthetic complications  History of prior surgery of interest to airway management or planning: Denies Family Hx of Anesthesia complications.   Denies Personal Hx of Anesthesia complications.   Social:  Smoker    Hematology/Oncology:  Hematology Normal   Oncology Normal     EENT/Dental:EENT/Dental Normal   Cardiovascular:   Hypertension    Pulmonary:  Pulmonary Normal    Renal/:  Renal/ Normal     Hepatic/GI:  Hepatic/GI Normal    Musculoskeletal:   Swelling of left knuckle, and decreased movement of left 5th metacarpal   Neurological:  Neurology Normal    Endocrine:  Endocrine Normal    Dermatological:  Skin Normal    Psych:  Psychiatric Normal           Physical Exam  General:  Well nourished    Airway/Jaw/Neck:  Airway Findings: Mouth Opening: Normal Tongue: Normal  General Airway Assessment: Adult  Mallampati: II  Improves to II with phonation.  TM Distance: Normal, at least 6 cm     Eyes/Ears/Nose:  Eyes/Ears/Nose Findings:    Dental:  Dental Findings: In tact   Chest/Lungs:  Chest/Lungs Findings: Clear to auscultation     Heart/Vascular:  Heart Findings: Rate: Normal  Rhythm: Regular Rhythm     Abdomen:  Abdomen Findings:  Soft       Mental Status:  Mental Status Findings:  Cooperative, Alert and Oriented         Anesthesia Plan  Type of Anesthesia, risks & benefits discussed:  Anesthesia Type:  general  Patient's Preference:   Intra-op Monitoring Plan: standard ASA monitors  Intra-op Monitoring Plan Comments:   Post Op Pain Control Plan: multimodal analgesia  Post Op Pain Control Plan Comments:   Induction:   IV  Beta Blocker:  Patient is not currently on a Beta-Blocker (No further  documentation required).       Informed Consent: Patient understands risks and agrees with Anesthesia plan.  Questions answered. Anesthesia consent signed with patient.  ASA Score: 2     Day of Surgery Review of History & Physical:    H&P update referred to the surgeon.         Ready For Surgery From Anesthesia Perspective.

## 2019-01-25 NOTE — ANESTHESIA POSTPROCEDURE EVALUATION
"Anesthesia Post Evaluation    Patient: Ryder Senior    Procedure(s) Performed: Procedure(s) (LRB):  REPAIR, HERNIA, INCISIONAL OR VENTRAL, OPEN w/ out mesh (N/A)  CHOLECYSTECTOMY, LAPAROSCOPIC (N/A)    Final Anesthesia Type: general  Patient location during evaluation: PACU  Patient participation: Yes- Able to Participate  Level of consciousness: awake and alert and oriented  Post-procedure vital signs: reviewed and stable  Pain management: adequate  Airway patency: patent  PONV status at discharge: No PONV  Anesthetic complications: no      Cardiovascular status: blood pressure returned to baseline and hemodynamically stable  Respiratory status: unassisted  Hydration status: euvolemic  Follow-up not needed.        Visit Vitals  /65   Pulse 65   Temp 36.1 °C (97 °F) (Temporal)   Resp 17   Ht 5' 11" (1.803 m)   Wt 99.3 kg (219 lb)   SpO2 98%   BMI 30.54 kg/m²       Pain/Lisa Score: Pain Rating Prior to Med Admin: 6 (1/25/2019  2:05 PM)  Lisa Score: 9 (1/25/2019 12:30 PM)        "

## 2019-01-25 NOTE — BRIEF OP NOTE
Operative Note       Surgery Date: 1/25/2019     Surgeon(s) and Role:     * Johnnie Schultz MD - Primary     * Cyrus Zaidi MD - Resident - Assisting    Pre-op Diagnosis:  Abdominal hernia without obstruction and without gangrene, recurrence not specified, unspecified hernia type [K46.9]  Symptomatic cholelithiasis [K80.20]    Post-op Diagnosis:  Abdominal hernia without obstruction and without gangrene, recurrence not specified, unspecified hernia type [K46.9]  Symptomatic cholelithiasis [K80.20]    Procedure(s) (LRB):  REPAIR, HERNIA, INCISIONAL OR VENTRAL, OPEN w/ out mesh (N/A)  CHOLECYSTECTOMY, LAPAROSCOPIC (N/A)    Anesthesia: General    Procedure in Detail/Findings:  Gallbladder with moderate adhesions, unopened.  Multiple small and large defects in periumbilical incision, 4 cm total.  Primary repair hernia.    Estimated Blood Loss: Minimal           Specimens (From admission, onward)    Start     Ordered    01/25/19 1125  Specimen to Pathology - Surgery  Once     Start Status   01/25/19 1125 Collected (01/25/19 1208)       01/25/19 1208        Implants: * No implants in log *           Disposition: PACU - hemodynamically stable.           Condition: Good    Attestation:  I was present and scrubbed for the entire procedure.

## 2019-01-25 NOTE — BRIEF OP NOTE
[facility]  Brief Operative Note    SUMMARY     Surgery Date: 1/25/2019     Surgeon(s) and Role:     * Johnnie Schultz MD - Primary     * Cyrus Zaidi MD - Resident - Assisting        Pre-op Diagnosis:  Abdominal hernia without obstruction and without gangrene, recurrence not specified, unspecified hernia type [K46.9]  Symptomatic cholelithiasis [K80.20]    Post-op Diagnosis:  Post-Op Diagnosis Codes:     * Abdominal hernia without obstruction and without gangrene, recurrence not specified, unspecified hernia type [K46.9]     * Symptomatic cholelithiasis [K80.20]    Procedure(s) (LRB):  REPAIR, HERNIA, INCISIONAL OR VENTRAL, OPEN w/ out mesh (N/A)  CHOLECYSTECTOMY, LAPAROSCOPIC (N/A)    Anesthesia: General    Description of Procedure: Gallbladder removed without complication.  3 by 4 cm non-incarcerated supraumbilical incisional hernia.    Description of the findings of the procedure: as above    Estimated Blood Loss: 20 ml         Specimens:   Specimen (12h ago, onward)    Start     Ordered    01/25/19 1125  Specimen to Pathology - Surgery  Once     Comments:  1.  Gallbladder -- PERMANENT2.  Hernia sac -- PERMANENT     Start Status   01/25/19 1125 Collected (01/25/19 1208)       01/25/19 1208

## 2019-01-26 VITALS
BODY MASS INDEX: 30.66 KG/M2 | WEIGHT: 219 LBS | RESPIRATION RATE: 18 BRPM | TEMPERATURE: 99 F | OXYGEN SATURATION: 93 % | HEART RATE: 61 BPM | SYSTOLIC BLOOD PRESSURE: 120 MMHG | DIASTOLIC BLOOD PRESSURE: 63 MMHG | HEIGHT: 71 IN

## 2019-01-26 PROCEDURE — 25000003 PHARM REV CODE 250: Performed by: STUDENT IN AN ORGANIZED HEALTH CARE EDUCATION/TRAINING PROGRAM

## 2019-01-26 RX ADMIN — HYDROCODONE BITARTRATE AND ACETAMINOPHEN 1 TABLET: 10; 325 TABLET ORAL at 08:01

## 2019-01-26 RX ADMIN — LOSARTAN POTASSIUM 50 MG: 25 TABLET, FILM COATED ORAL at 08:01

## 2019-01-26 NOTE — DISCHARGE SUMMARY
[facility]  General Surgery  Discharge Summary      Patient Name: Ryder Senior  MRN: [id number]  Admission Date: 1/25/2019  Hospital Length of Stay: 1 days  Discharge Date and Time:  01/26/2019 6:32 AM  Attending Physician: Johnnie Schultz MD   Discharging Provider: Benito Dexter Jr., MD  Primary Care Provider: Wilfrid Patel MD    HPI:   Patient presented electively for laparoscopic cholecystectomy and concomitant incisional hernia repair without mesh on 1/25/19.    Procedure(s) (LRB):  REPAIR, HERNIA, INCISIONAL OR VENTRAL, OPEN w/ out mesh (N/A)  CHOLECYSTECTOMY, LAPAROSCOPIC (N/A)      Indwelling Lines/Drains at time of discharge:   Lines/Drains/Airways          None        Hospital Course: Patient tolerated the procedure well.  He was observed overnight and was found to be in stable condition for discharge.  He was ambulating with well controlled pain and tolerating diet without nausea or vomiting.    Consults: None    Significant Diagnostic Studies: None    Pending Diagnostic Studies:     None        Final Active Diagnoses:    Diagnosis Date Noted POA    PRINCIPAL PROBLEM:  Gallstones [K80.20] 01/25/2019 Yes      Problems Resolved During this Admission:      Discharged Condition: good    Disposition:     Follow Up:  Follow-up Information     Johnnie Schultz MD In 2 weeks.    Specialties:  General Surgery, Bariatrics  Why:  post op  Contact information:  [address] [facility] [address]  [phone]                 Patient Instructions:      Diet Adult Regular     Notify your health care provider if you experience any of the following:  difficulty breathing or increased cough     Notify your health care provider if you experience any of the following:  redness, tenderness, or signs of infection (pain, swelling, redness, odor or green/yellow discharge around incision site)     Notify your health care provider if you experience any of the following:   severe uncontrolled pain     Notify your health care provider if you experience any of the following:  persistent nausea and vomiting or diarrhea     Notify your health care provider if you experience any of the following:  temperature >100.4     Lifting restrictions   Order Comments: Do not lift anything heavier than 10 lbs for 6 weeks     Other restrictions (specify):   Order Comments: Do not drive while taking medication for pain.    If you are taking the prescribed pain medication, take Miralax daily for constipation.  If you are having more than 2-3 bowel movements per day, decrease use of Miralax.     Remove dressing in 48 hours   Order Comments: OK to remove outer bandages in 48 hours prior to showering.  There are small strips of white tape directly over the incisions.  Allow these to fall off on their own.  This process could take up to 2 weeks.     Shower on day dressing removed (No bath)   Order Comments: Keep incisions clean and dry for 48 hours.  After 48 hours you may shower over the incisions; however, do not submerge incisions under water (for example pool, bath, lake, etc) for a total of 2 weeks after surgery.     Medications:  Reconciled Home Medications:      Medication List      START taking these medications    oxyCODONE-acetaminophen 5-325 mg per tablet  Commonly known as:  PERCOCET  Take 1 tablet by mouth every 4 (four) hours as needed for Pain (Take 1-2 as needed every 4 hours).        CHANGE how you take these medications    cyclobenzaprine 10 MG tablet  Commonly known as:  FLEXERIL  Take 1 tablet (10 mg total) by mouth every evening.  What changed:    · when to take this  · reasons to take this     losartan 50 MG tablet  Commonly known as:  COZAAR  Take 0.5-1 tablets (25-50 mg total) by mouth once daily. Patient takes 0.5 to 1 whole tablet at night  What changed:    · how much to take  · additional instructions        CONTINUE taking these medications    acetaminophen 325 MG  "tablet  Commonly known as:  TYLENOL  Take 325 mg by mouth every 6 (six) hours as needed for Pain.     ALPRAZolam 0.25 MG tablet  Commonly known as:  XANAX  Take 1 tablet (0.25 mg total) by mouth nightly as needed for Anxiety.     GREEN TEA EXTRACT ORAL  Take by mouth.     insulin syringe-needle U-100 0.3 mL 31 gauge x 5/16" Syrg  Use as directed with ICI Therapy     ketoconazole 2 % cream  Commonly known as:  NIZORAL  Apply topically 2 (two) times daily.     magnesium oxide 400 mg (241.3 mg magnesium) tablet  Commonly known as:  MAG-OX  Take 1 tablet (400 mg total) by mouth once daily.     multivitamin capsule  Take 1 capsule by mouth once daily.     papaverine 30 mg/mL injection  Add Phentolamine 1 mg/cc  Add PGE1 10 mcg/cc     sildenafil 20 mg Tab  Commonly known as:  REVATIO  Take 2-5 tablets ( mg total) by mouth daily as needed.     tadalafil 20 MG Tab  Commonly known as:  CIALIS  Take 1 tablet (20 mg total) by mouth daily as needed.        STOP taking these medications    nystatin cream  Commonly known as:  MYCOSTATIN          Time spent on the discharge of patient: 10 minutes    Benito Dexter Jr., MD  General Surgery  [facility]  "

## 2019-01-26 NOTE — NURSING
Discharge instructions given to patient. Verbalizes understanding. IV removed, catheter intact. No complaints or concerns voiced at this time.

## 2019-01-26 NOTE — OP NOTE
[facility]  Cholecystectomy and Primary repair of umbilical hernia  Procedure Note    SUMMARY     Date of Procedure: 1/25/2019   Procedure: Laparoscopic cholecystectomy and primary repair of periumbilical hernia    Provider: Johnnie Schultz MD    Assisting Provider: Cyrus Zaidi MD - Resident      Indications: This patient presents with symptomatic gallbladder disease and will undergo laparoscopic cholecystectomy.  He also has symptomatic hernia and will undergo repair.    Pre-Operative Diagnosis: as above    Post-Operative Diagnosis: as above    Anesthesia: General    Description of the Findings of the Procedure:     The patient was seen again in the Holding Room. The risks, benefits, complications, treatment options, and expected outcomes were discussed with the patient. The possibilities of reaction to medication, pulmonary aspiration, perforation of viscus, bleeding, recurrent infection, finding a normal gallbladder, the need for additional procedures, failure to diagnose a condition, the possible need to convert to an open procedure, and creating a complication requiring transfusion or operation were discussed with the patient. The patient and/or family concurred with the proposed plan, giving informed consent. The site of surgery properly noted/marked. The patient was taken to Operating Room, identified as Ryder Senior and the procedure verified as Laparoscopic Cholecystectomy with open hernia repair. A Time Out was held and the above information confirmed.    Prior to the induction of general anesthesia, antibiotic prophylaxis was administered. General endotracheal anesthesia was then administered and tolerated well. After the induction, the abdomen was prepped in the usual sterile fashion.  The patient was positioned supine.    Local anesthetic agent was injected into the skin near the umbilicus and an incision made. The midline fascia was incised in an open technique and  optiview was then used to enter the abdomen.  Pneumoperitoneum was then created with CO2 and tolerated well without any adverse changes in the patient's vital signs. Additional trocars were introduced under direct vision. All skin incisions were infiltrated with a local anesthetic agent before making the incision and placing the trocars.     The gallbladder was identified, the fundus grasped and retracted cephalad. Adhesions were lysed bluntly and with the electrocautery where indicated, taking care not to injure any adjacent organs or viscus. The infundibulum was grasped and retracted laterally, exposing the peritoneum overlying the triangle of Calot. This was then divided and exposed in a blunt fashion. The cystic duct was clearly identified and bluntly dissected circumferentially. The junctions of the gallbladder, cystic duct and common bile duct were clearly identified prior to the division of any linear structure.     The cystic artery was then doubly ligated with surgical clips on the patient side and singly clipped on the gallbladder side and divided. The cystic duct was identified, dissected free, ligated with clips and divided as well.     The gallbladder was dissected from the liver bed in retrograde fashion with the electrocautery. The gallbladder was removed. The liver bed was irrigated and inspected. Hemostasis was achieved with the electrocautery. Copious irrigation was utilized and was repeatedly aspirated until clear all particulate matter.    Pneumoperitoneum was completely reduced after viewing removal of the trocars under direct vision. The wound was thoroughly irrigated at which point attention was directed at the periumbilical hernia.  There were multiple small hernias that together totaled 4 cm.  These were opened with bovie cautery.  Once a single 4 cm hernia was noted, multiple 0 Prolene was used in figure of eight fashion to close the fascia.  Then 2-0 Vicryl was used to recreate the  umbilicus.  3-0 Vicryl was used to close the wound and 4-0 Vicryl used to close the skin.  The skin was then closed from the trocar sites with 4-0 Plain gut and a sterile dressing was applied.    Instrument, sponge, and needle counts were correct at closure and at the conclusion of the case.     Complications: None; patient tolerated the procedure well.    Estimated Blood Loss (EBL): less than 50 mL     Drains: none           Implants: none    Specimens: Gallbladder, hernia sac           Condition: stable    Disposition: PACU - hemodynamically stable.    Attestation: I was present and scrubbed for the entire procedure.

## 2019-01-26 NOTE — HPI
Patient presented electively for laparoscopic cholecystectomy and concomitant incisional hernia repair without mesh on 1/25/19.

## 2019-01-26 NOTE — HOSPITAL COURSE
Patient tolerated the procedure well.  He was observed overnight and was found to be in stable condition for discharge.  He was ambulating with well controlled pain and tolerating diet without nausea or vomiting.

## 2019-02-05 ENCOUNTER — OFFICE VISIT (OUTPATIENT)
Dept: SURGERY | Facility: CLINIC | Age: 69
End: 2019-02-05
Payer: MEDICARE

## 2019-02-05 VITALS
HEART RATE: 73 BPM | BODY MASS INDEX: 29.96 KG/M2 | WEIGHT: 214 LBS | DIASTOLIC BLOOD PRESSURE: 79 MMHG | SYSTOLIC BLOOD PRESSURE: 123 MMHG | HEIGHT: 71 IN

## 2019-02-05 DIAGNOSIS — Z09 POSTOP CHECK: Primary | ICD-10-CM

## 2019-02-05 PROBLEM — K43.2 INCISIONAL HERNIA, WITHOUT OBSTRUCTION OR GANGRENE: Status: RESOLVED | Noted: 2018-07-17 | Resolved: 2019-02-05

## 2019-02-05 PROBLEM — K80.20 GALLSTONES: Status: RESOLVED | Noted: 2019-01-25 | Resolved: 2019-02-05

## 2019-02-05 PROCEDURE — 99024 POSTOP FOLLOW-UP VISIT: CPT | Mod: POP,,, | Performed by: SURGERY

## 2019-02-05 PROCEDURE — 99999 PR PBB SHADOW E&M-EST. PATIENT-LVL III: ICD-10-PCS | Mod: PBBFAC,,, | Performed by: SURGERY

## 2019-02-05 PROCEDURE — 99024 PR POST-OP FOLLOW-UP VISIT: ICD-10-PCS | Mod: POP,,, | Performed by: SURGERY

## 2019-02-05 PROCEDURE — 99213 OFFICE O/P EST LOW 20 MIN: CPT | Mod: PBBFAC | Performed by: SURGERY

## 2019-02-05 PROCEDURE — 99999 PR PBB SHADOW E&M-EST. PATIENT-LVL III: CPT | Mod: PBBFAC,,, | Performed by: SURGERY

## 2019-02-05 NOTE — PROGRESS NOTES
"Ryder Senior is a 68 y.o. male patient.   1. Postop check      Past Medical History:   Diagnosis Date    a H/O Post-Prostatectomy RLE DVT In 09/2017     [facility] 9/28/17-10/6/17 Stay Included This DX; He Was TXd With Eliquis X 6 Months    b Hypertension     c Mild Hypercholesterolemia With High HDL     Will Monitor    j Cholelithiasis 1/25/19    Dr. Johnnie Schultz With Perform A Laparoscopic Cholecystectomy And Incisional Hernia Repair On 1/25/19    j Family H/O Colon Cancer     Dr. Aime Greenwood On 8/29/18 OV Note: "Repeat TC In 1 Year;" His Daughter    j H. Pylori Negative Gastritis     Dr. Aime Greenwood; 08/2018 EGD = H. Pylori Negative Gastritis, But Otherwise Normal    j H/O Clostridium Difficile Colitis In 09/2017     [facility] 9/28/17-10/6/17 Stay Included This DX    j H/O Hernia Repair Surgery     j H/O Post-Prostatectomy Ileus In 09/2017     [facility] 9/28/17-10/6/17 Stay Included This DX    j Left Supraumbilical Incisional Hernia 1/25/19    Dr. Johnnie Schultz With Perform A Laparoscopic Cholecystectomy And Incisional Hernia Repair On 1/25/19    k Erectile Dysfunction After Prostatectomy     Dr. Tyrese Yanes; On Papaverine Penile Injections PRN; Cialis 20 Mg Doesn't Work    k Prostate Cancer S/P Laparoscopic Prostatectomy 09/2017     Dr. Tyrese Yanes    l H/O Arthroscopic Elbow Surgery     l H/O Arthroscopic Knee Surgery     l H/O Carpal Tunnel Release Surgery     m Family H/O Alzheimer's Disease     His Father     No past surgical history pertinent negatives on file.  Scheduled Meds:  Continuous Infusions:  PRN Meds:    Review of patient's allergies indicates:   Allergen Reactions    Coconut Hives    Pineapple Hives     There are no hospital problems to display for this patient.    Blood pressure 123/79, pulse 73, height 5' 11" (1.803 m), weight 97.1 kg (214 lb).    Subjective S/p lap evan and open primary repair incisional hernia at the navel 1/25/19.  He has no " complaints.  Objective Abdomen benign, wounds clear   Assessment & Plan He is doing well after cholecystectomy and hernia repair.  Light duty one more month and follow up prn.       Johnnie Schultz MD  2/5/2019

## 2019-02-05 NOTE — LETTER
[facility] - General [facility]  [address]  [facility] [address]  Phone: [phone] February 5, 2019      Wilfrid Patel MD  [address] [place] [address] Dr [place] [address]    Patient: Ryder Senior   MR Number: [id number]   YOB: 1950   Date of Visit: 2/5/2019     Dear Dr. Patel:    Thank you for referring Ryder Senior to me for evaluation. Attached you will find relevant portions of my assessment and plan of care.    Mr. Senior is doing well after cholecystectomy and hernia repair.  He is to continue light duty restrictions for one more month and follow up as needed.    If you have questions, please do not hesitate to call me. I look forward to following Ryder Senior along with you.    Sincerely,      Johnnie Schultz MD  Professor, [employer]  Section Head, General Surgery  [facility]    WSR/afw

## 2019-03-18 ENCOUNTER — LAB VISIT (OUTPATIENT)
Dept: LAB | Facility: HOSPITAL | Age: 69
End: 2019-03-18
Attending: UROLOGY
Payer: MEDICARE

## 2019-03-18 DIAGNOSIS — C61 PROSTATE CANCER: ICD-10-CM

## 2019-03-18 LAB — COMPLEXED PSA SERPL-MCNC: <0.01 NG/ML

## 2019-03-18 PROCEDURE — 84153 ASSAY OF PSA TOTAL: CPT

## 2019-03-18 PROCEDURE — 36415 COLL VENOUS BLD VENIPUNCTURE: CPT | Mod: PO

## 2019-03-19 ENCOUNTER — OFFICE VISIT (OUTPATIENT)
Dept: UROLOGY | Facility: CLINIC | Age: 69
End: 2019-03-19
Payer: MEDICARE

## 2019-03-19 VITALS
SYSTOLIC BLOOD PRESSURE: 129 MMHG | HEIGHT: 71 IN | DIASTOLIC BLOOD PRESSURE: 73 MMHG | RESPIRATION RATE: 15 BRPM | HEART RATE: 63 BPM | WEIGHT: 218.25 LBS | BODY MASS INDEX: 30.56 KG/M2

## 2019-03-19 DIAGNOSIS — C61 PROSTATE CANCER: Primary | ICD-10-CM

## 2019-03-19 PROCEDURE — 99999 PR PBB SHADOW E&M-EST. PATIENT-LVL III: CPT | Mod: PBBFAC,,, | Performed by: UROLOGY

## 2019-03-19 PROCEDURE — 99999 PR PBB SHADOW E&M-EST. PATIENT-LVL III: ICD-10-PCS | Mod: PBBFAC,,, | Performed by: UROLOGY

## 2019-03-19 PROCEDURE — 99214 OFFICE O/P EST MOD 30 MIN: CPT | Mod: S$PBB,,, | Performed by: UROLOGY

## 2019-03-19 PROCEDURE — 99214 PR OFFICE/OUTPT VISIT, EST, LEVL IV, 30-39 MIN: ICD-10-PCS | Mod: S$PBB,,, | Performed by: UROLOGY

## 2019-03-19 PROCEDURE — 99213 OFFICE O/P EST LOW 20 MIN: CPT | Mod: PBBFAC | Performed by: UROLOGY

## 2019-03-19 RX ORDER — PAPAVERINE HYDROCHLORIDE 30 MG/ML
INJECTION INTRAMUSCULAR; INTRAVENOUS
Qty: 10 ML | Refills: 12 | Status: SHIPPED | OUTPATIENT
Start: 2019-03-19 | End: 2023-05-11 | Stop reason: SDUPTHER

## 2019-03-19 NOTE — PROGRESS NOTES
"Clinic Note  3/19/2019      Subjective:         Chief Complaint:   HPI  Ryder Senior is a 69 y.o. male with Prostate Cancer; s/p robotic assisted laparoscopic prostatectomy 9/25/2017.  Cary 5 (3+2); (-) SV,extenson, margins (close to left marigin; pT2, pN0, pMx.  Continent. erectile dysfunction an issue, PEP and Cialis.Getting partial erections on his own at night.  Some pain with PEP.      Lab Results   Component Value Date    PSADIAG <0.01 03/18/2019    PSADIAG <0.01 09/04/2018    PSADIAG <0.01 03/02/2018    PSADIAG 0.02 11/01/2017      Past Medical History:   Diagnosis Date    a H/O Post-Prostatectomy RLE DVT In 09/2017     [facility] 9/28/17-10/6/17 Stay Included This DX; He Was TXd With Eliquis X 6 Months    b Hypertension     c Mild Hypercholesterolemia With High HDL     Will Monitor    j Cholelithiasis 1/25/19    Dr. Johnnie Schultz With Perform A Laparoscopic Cholecystectomy And Incisional Hernia Repair On 1/25/19    j Family H/O Colon Cancer     Dr. Aime Greenwood On 8/29/18 OV Note: "Repeat TC In 1 Year;" His Daughter    j H. Pylori Negative Gastritis     Dr. Aime Greenwood; 08/2018 EGD = H. Pylori Negative Gastritis, But Otherwise Normal    j H/O Clostridium Difficile Colitis In 09/2017     [facility] 9/28/17-10/6/17 Stay Included This DX    j H/O Hernia Repair Surgery     j H/O Post-Prostatectomy Ileus In 09/2017     [facility] 9/28/17-10/6/17 Stay Included This DX    j Left Supraumbilical Incisional Hernia 1/25/19    Dr. Johnnie Schultz With Perform A Laparoscopic Cholecystectomy And Incisional Hernia Repair On 1/25/19    k Erectile Dysfunction After Prostatectomy     Dr. Tyrese Yanes; On Papaverine Penile Injections PRN; Cialis 20 Mg Doesn't Work    k Prostate Cancer S/P Laparoscopic Prostatectomy 09/2017     Dr. Tyrese Yanes    l H/O Arthroscopic Elbow Surgery     l H/O Arthroscopic Knee Surgery     l H/O Carpal Tunnel Release Surgery     m Family H/O Alzheimer's Disease     " His Father     Family History   Problem Relation Age of Onset    Prostate cancer Father     Alzheimer's disease Father     Cancer Mother     Prostate cancer Maternal Grandfather      Social History     Socioeconomic History    Marital status:      Spouse name: Not on file    Number of children: Not on file    Years of education: Not on file    Highest education level: Not on file   Social Needs    Financial resource strain: Not on file    Food insecurity - worry: Not on file    Food insecurity - inability: Not on file    Transportation needs - medical: Not on file    Transportation needs - non-medical: Not on file   Occupational History    Not on file   Tobacco Use    Smoking status: Never Smoker    Smokeless tobacco: Never Used   Substance and Sexual Activity    Alcohol use: Yes     Comment: very seldom    Drug use: Yes     Types: Marijuana     Comment: occasional    Sexual activity: Yes     Partners: Female   Other Topics Concern    Not on file   Social History Narrative    Not on file     Past Surgical History:   Procedure Laterality Date    CHOLECYSTECTOMY, LAPAROSCOPIC N/A 1/25/2019    Performed by Johnnie Schultz [facility] at [facility] OR [address] [place] [address]    corpal tunnel  2014    ELBOW ARTHROPLASTY  2012    HERNIA REPAIR  2013    [facility] - right inguinal    KNEE ARTHROSCOPY  2002    PROSTATECTOMY  09/2017    pelon yanes [facility] - [facility]    REPAIR, HERNIA, INCISIONAL OR VENTRAL, OPEN w/ out mesh N/A 1/25/2019    Performed by Johnnie Schultz MD at [facility] OR [address] [place] [address]    ROBOTIC ASSISTED LAPAROSCOPIC PROSTATECTOMY N/A 9/25/2017    Performed by Tyrese Yanes MD at [facility] OR [address] [place] [address]     Patient Active Problem List   Diagnosis    Male urinary stress incontinence    Hypertension    Prostate Cancer S/P Laparoscopic Prostatectomy 09/2017    Erectile Dysfunction After Prostatectomy    Family H/O Alzheimer's Disease    H/O Arthroscopic Knee Surgery    H/O Carpal Tunnel Release  "Surgery    H/O Arthroscopic Elbow Surgery    H/O Hernia Repair Surgery    Family H/O Colon Cancer    Mild Hypercholesterolemia With High HDL    H. Pylori Negative Gastritis     Review of Systems   Constitutional: Negative for appetite change, chills, fatigue, fever and unexpected weight change.   HENT: Negative for nosebleeds.    Respiratory: Negative for shortness of breath and wheezing.    Cardiovascular: Negative for chest pain, palpitations and leg swelling.   Gastrointestinal: Negative for abdominal distention, abdominal pain, constipation, diarrhea, nausea and vomiting.   Musculoskeletal: Negative for arthralgias and back pain.   Skin: Negative for pallor.   Neurological: Negative for dizziness, seizures and syncope.   Hematological: Negative for adenopathy.   Psychiatric/Behavioral: Negative for dysphoric mood.         Objective:      /73   Pulse 63   Resp 15   Ht 5' 11" (1.803 m)   Wt 99 kg (218 lb 4.1 oz)   BMI 30.44 kg/m²   Estimated body mass index is 30.44 kg/m² as calculated from the following:    Height as of this encounter: 5' 11" (1.803 m).    Weight as of this encounter: 99 kg (218 lb 4.1 oz).  Physical Exam   Constitutional: He is oriented to person, place, and time. He appears well-developed and well-nourished. No distress.   HENT:   Head: Atraumatic.   Neck: No tracheal deviation present.   Cardiovascular: Normal rate.    Pulmonary/Chest: Effort normal. No respiratory distress. He has no wheezes.   Abdominal: Soft. Bowel sounds are normal. He exhibits no distension and no mass. There is no tenderness. There is no rebound and no guarding.   Genitourinary: Penis normal.   Genitourinary Comments: Small dorsal plot   Neurological: He is alert and oriented to person, place, and time.   Skin: Skin is warm and dry. He is not diaphoretic.     Psychiatric: He has a normal mood and affect. His behavior is normal. Judgment and thought content normal.         Assessment and Plan:         "   Problem List Items Addressed This Visit     Prostate Cancer S/P Laparoscopic Prostatectomy 09/2017 - Primary          Follow up:       Tyrese Yanes

## 2019-09-16 ENCOUNTER — LAB VISIT (OUTPATIENT)
Dept: LAB | Facility: HOSPITAL | Age: 69
End: 2019-09-16
Attending: UROLOGY
Payer: MEDICARE

## 2019-09-16 DIAGNOSIS — C61 PROSTATE CANCER: ICD-10-CM

## 2019-09-16 LAB — COMPLEXED PSA SERPL-MCNC: <0.01 NG/ML (ref 0–4)

## 2019-09-16 PROCEDURE — 84153 ASSAY OF PSA TOTAL: CPT

## 2019-09-16 PROCEDURE — 36415 COLL VENOUS BLD VENIPUNCTURE: CPT | Mod: PO

## 2019-10-08 ENCOUNTER — OFFICE VISIT (OUTPATIENT)
Dept: UROLOGY | Facility: CLINIC | Age: 69
End: 2019-10-08
Payer: MEDICARE

## 2019-10-08 VITALS
BODY MASS INDEX: 31.36 KG/M2 | SYSTOLIC BLOOD PRESSURE: 130 MMHG | WEIGHT: 224.88 LBS | DIASTOLIC BLOOD PRESSURE: 75 MMHG | HEART RATE: 65 BPM

## 2019-10-08 DIAGNOSIS — C61 PROSTATE CANCER: Primary | ICD-10-CM

## 2019-10-08 PROCEDURE — 99999 PR PBB SHADOW E&M-EST. PATIENT-LVL III: CPT | Mod: PBBFAC,,, | Performed by: UROLOGY

## 2019-10-08 PROCEDURE — 99214 PR OFFICE/OUTPT VISIT, EST, LEVL IV, 30-39 MIN: ICD-10-PCS | Mod: S$PBB,,, | Performed by: UROLOGY

## 2019-10-08 PROCEDURE — 99213 OFFICE O/P EST LOW 20 MIN: CPT | Mod: PBBFAC | Performed by: UROLOGY

## 2019-10-08 PROCEDURE — 99214 OFFICE O/P EST MOD 30 MIN: CPT | Mod: S$PBB,,, | Performed by: UROLOGY

## 2019-10-08 PROCEDURE — 99999 PR PBB SHADOW E&M-EST. PATIENT-LVL III: ICD-10-PCS | Mod: PBBFAC,,, | Performed by: UROLOGY

## 2019-10-08 NOTE — PROGRESS NOTES
"Clinic Note  10/8/2019      Subjective:         Chief Complaint:   HPI  Ryder Senior is a 69 y.o. male with Prostate Cancer; s/p robotic assisted laparoscopic prostatectomy 9/25/2017.  [place] 5 (3+2); (-) SV,extenson, margins (close to left marigin; pT2, pN0, pMx.  Continent. erectile dysfunction an issue, PEP and Cialis.Getting partial erections on his own at night.  Some pain with PEP.      Lab Results   Component Value Date    PSADIAG <0.01 09/16/2019    PSADIAG <0.01 03/18/2019    PSADIAG <0.01 09/04/2018    PSADIAG <0.01 03/02/2018    PSADIAG 0.02 11/01/2017      Past Medical History:   Diagnosis Date    a First Degree AV Block     4/26/19 Referred To Dr. Westley Leahy    a H/O Post-Prostatectomy RLE DVT In 09/2017     [facility] 9/28/17-10/6/17 Stay Included This DX; He Was TXd With Eliquis X 6 Months    a Moderately High Cardiac CT Calcium Score     4/26/19 Referred To Dr. Westley Leahy; 4/25/19 Cardiac CT Calcium Score = 121 (See Report)    b Hypertension     c Mild Hypercholesterolemia With High HDL     Will Monitor    j Family H/O Colon Cancer     Dr. Aime Greenwood On 8/29/18 OV Note: "Repeat TC In 1 Year;" His Daughter    j H. Pylori Negative Gastritis     Dr. Aime Greenwood; 08/2018 EGD = H. Pylori Negative Gastritis, But Otherwise Normal    j H/O Cholecystectomy In 01/2019     Dr. Johnnie Schultz Performed A Laparoscopic Cholecystectomy And Periumbilical Incisional Hernia Repair On 1/25/19    j H/O Clostridium Difficile Colitis In 09/2017     [facility] 9/28/17-10/6/17 Stay Included This DX    j H/O Periumbilical Hernia Repair In 01/2019     Dr. Johnnie Schultz Performed A Laparoscopic Cholecystectomy And Periumbilical Incisional Hernia Repair On 1/25/19    j H/O Post-Prostatectomy Ileus In 09/2017     [facility] 9/28/17-10/6/17 Stay Included This DX    k Erectile Dysfunction After Prostatectomy     Dr. Tyrese Yanes; On Papaverine Penile Injections PRN; Cialis 20 Mg Doesn't Work    k " Prostate Cancer S/P Laparoscopic Prostatectomy 09/2017     Dr. Tyrese Yanes    l H/O Arthroscopic Elbow Surgery     l H/O Arthroscopic Knee Surgery     l H/O Carpal Tunnel Release Surgery     m Family H/O Alzheimer's Disease     His Father     Family History   Problem Relation Age of Onset    Prostate cancer Father     Alzheimer's disease Father     Cancer Mother     Prostate cancer Maternal Grandfather      Social History     Socioeconomic History    Marital status:      Spouse name: Not on file    Number of children: Not on file    Years of education: Not on file    Highest education level: Not on file   Occupational History    Not on file   Social Needs    Financial resource strain: Not on file    Food insecurity:     Worry: Not on file     Inability: Not on file    Transportation needs:     Medical: Not on file     Non-medical: Not on file   Tobacco Use    Smoking status: Never Smoker    Smokeless tobacco: Never Used   Substance and Sexual Activity    Alcohol use: Yes     Comment: very seldom    Drug use: Yes     Types: Marijuana     Comment: occasional    Sexual activity: Yes     Partners: Female   Lifestyle    Physical activity:     Days per week: Not on file     Minutes per session: Not on file    Stress: Not on file   Relationships    Social connections:     Talks on phone: Not on file     Gets together: Not on file     Attends Nondenominational service: Not on file     Active member of club or organization: Not on file     Attends meetings of clubs or organizations: Not on file     Relationship status: Not on file   Other Topics Concern    Not on file   Social History Narrative    Not on file     Past Surgical History:   Procedure Laterality Date    corpal tunnel  2014    ELBOW ARTHROPLASTY  2012    HERNIA REPAIR  2013    [facility] - right inguinal    KNEE ARTHROSCOPY  2002    LAPAROSCOPIC CHOLECYSTECTOMY N/A 1/25/2019    Procedure: CHOLECYSTECTOMY, LAPAROSCOPIC;  Surgeon:  "Johnnie Schultz MD;  Location: [facility] OR [address] [place] [address];  Service: General;  Laterality: N/A;    PROSTATECTOMY  09/2017    pelon hidalgo md - [facility]     Patient Active Problem List   Diagnosis    Male urinary stress incontinence    Hypertension    Prostate Cancer S/P Laparoscopic Prostatectomy 09/2017    Erectile Dysfunction After Prostatectomy    Family H/O Alzheimer's Disease    H/O Arthroscopic Knee Surgery    H/O Carpal Tunnel Release Surgery    H/O Arthroscopic Elbow Surgery    Family H/O Colon Cancer    Mild Hypercholesterolemia With High HDL    H. Pylori Negative Gastritis    H/O Periumbilical Hernia Repair In 01/2019    H/O Cholecystectomy In 01/2019    First Degree AV Block    At risk for coronary artery disease    Moderately High Cardiac CT Calcium Score     Review of Systems   Constitutional: Negative for appetite change, chills, fatigue, fever and unexpected weight change.   HENT: Negative for nosebleeds.    Respiratory: Negative for shortness of breath and wheezing.    Cardiovascular: Negative for chest pain, palpitations and leg swelling.   Gastrointestinal: Negative for abdominal distention, abdominal pain, constipation, diarrhea, nausea and vomiting.   Genitourinary: Negative for dysuria and hematuria.   Musculoskeletal: Negative for arthralgias and back pain.   Skin: Negative for pallor.   Neurological: Negative for dizziness, seizures and syncope.   Hematological: Negative for adenopathy.   Psychiatric/Behavioral: Negative for dysphoric mood.         Objective:      There were no vitals taken for this visit.  Estimated body mass index is 31.52 kg/m² as calculated from the following:    Height as of 7/19/19: 5' 11" (1.803 m).    Weight as of 7/19/19: 102.5 kg (226 lb).  Physical Exam   Constitutional: He is oriented to person, place, and time. He appears well-developed and well-nourished. No distress.   HENT:   Head: Atraumatic.   Neck: No tracheal deviation present. "   Cardiovascular: Normal rate.    Pulmonary/Chest: Effort normal. No respiratory distress. He has no wheezes.   Abdominal: Soft. Bowel sounds are normal. He exhibits no distension and no mass. There is no tenderness. There is no rebound and no guarding.   Neurological: He is alert and oriented to person, place, and time.   Skin: Skin is warm and dry. He is not diaphoretic.     Psychiatric: He has a normal mood and affect. His behavior is normal. Judgment and thought content normal.         Assessment and Plan:           Problem List Items Addressed This Visit     Prostate Cancer S/P Laparoscopic Prostatectomy 09/2017 - Primary          Follow up:   6 months with PSA.    Tyrese Yanes

## 2020-04-20 DIAGNOSIS — C61 PROSTATE CANCER: Primary | ICD-10-CM

## 2020-04-21 ENCOUNTER — LAB VISIT (OUTPATIENT)
Dept: LAB | Facility: HOSPITAL | Age: 70
End: 2020-04-21
Attending: UROLOGY
Payer: MEDICARE

## 2020-04-21 DIAGNOSIS — C61 PROSTATE CANCER: ICD-10-CM

## 2020-04-21 LAB — COMPLEXED PSA SERPL-MCNC: <0.01 NG/ML (ref 0–4)

## 2020-04-21 PROCEDURE — 36415 COLL VENOUS BLD VENIPUNCTURE: CPT | Mod: PO

## 2020-04-21 PROCEDURE — 84153 ASSAY OF PSA TOTAL: CPT

## 2020-04-23 ENCOUNTER — OFFICE VISIT (OUTPATIENT)
Dept: UROLOGY | Facility: CLINIC | Age: 70
End: 2020-04-23
Payer: MEDICARE

## 2020-04-23 DIAGNOSIS — C61 PROSTATE CANCER: Primary | ICD-10-CM

## 2020-04-23 PROCEDURE — 99213 PR OFFICE/OUTPT VISIT, EST, LEVL III, 20-29 MIN: ICD-10-PCS | Mod: 95,,, | Performed by: UROLOGY

## 2020-04-23 PROCEDURE — 99213 OFFICE O/P EST LOW 20 MIN: CPT | Mod: 95,,, | Performed by: UROLOGY

## 2020-04-23 NOTE — PROGRESS NOTES
"Clinic Note  4/23/2020      Subjective:         Chief Complaint:   HPI  Ryder Senior is a 70 y.o. male with Prostate Cancer; s/p robotic assisted laparoscopic prostatectomy 9/25/2017.  [place] 5 (3+2); (-) SV,extenson, margins (close to left marigin; pT2, pN0, pMx.  Continent. erectile dysfunction an issue, PEP and Cialis.Getting partial erections on his own at night.  Some pain with PEP.     The patient location is: home  The chief complaint leading to consultation is: prostate cancer  Visit type: audiovisual  Total time spent with patient: 15  Each patient to whom he or she provides medical services by telemedicine is:  (1) informed of the relationship between the physician and patient and the respective role of any other health care provider with respect to management of the patient; and (2) notified that he or she may decline to receive medical services by telemedicine and may withdraw from such care at any time.          Lab Results   Component Value Date    PSADIAG <0.01 04/21/2020    PSADIAG <0.01 09/16/2019    PSADIAG <0.01 03/18/2019    PSADIAG <0.01 09/04/2018    PSADIAG <0.01 03/02/2018    PSADIAG 0.02 11/01/2017      Past Medical History:   Diagnosis Date    a First Degree AV Block     4/26/19 Referred To Dr. Westley Leahy    a H/O Post-Prostatectomy RLE DVT In 09/2017     [facility]/28/17-10/6/17 Stay Included This DX; He Was TXd With Eliquis X 6 Months    a Moderately High Cardiac CT Calcium Score     4/26/19 Referred To Dr. Westley Leahy; 4/25/19 Cardiac CT Calcium Score = 121 (See Report)    b Hypertension     c Mild Hypercholesterolemia With High HDL     Will Monitor    j Family H/O Colon Cancer     Dr. Aime Greenwood On 8/29/18 OV Note: "Repeat TC In 1 Year;" His Daughter    j H. Pylori Negative Gastritis     Dr. Aime Greenwood; 08/2018 EGD = H. Pylori Negative Gastritis, But Otherwise Normal    j H/O Cholecystectomy In 01/2019     Dr. Johnnie Schultz Performed A Laparoscopic " Cholecystectomy And Periumbilical Incisional Hernia Repair On 1/25/19    j H/O Clostridium Difficile Colitis In 09/2017     [facility] 9/28/17-10/6/17 Stay Included This DX    j H/O Periumbilical Hernia Repair In 01/2019     Dr. Johnnie Schultz Performed A Laparoscopic Cholecystectomy And Periumbilical Incisional Hernia Repair On 1/25/19    j H/O Post-Prostatectomy Ileus In 09/2017     [facility] 9/28/17-10/6/17 Stay Included This DX    k Erectile Dysfunction After Prostatectomy     Dr. Tyrese Yanes; On Papaverine Penile Injections PRN; Cialis 20 Mg Doesn't Work    k Prostate Cancer S/P Laparoscopic Prostatectomy 09/2017     Dr. Tyrese Yanes    l H/O Arthroscopic Elbow Surgery     l H/O Arthroscopic Knee Surgery     l H/O Carpal Tunnel Release Surgery     m Family H/O Alzheimer's Disease     His Father     Family History   Problem Relation Age of Onset    Prostate cancer Father     Alzheimer's disease Father     Cancer Mother     Prostate cancer Maternal Grandfather      Social History     Socioeconomic History    Marital status:      Spouse name: Not on file    Number of children: Not on file    Years of education: Not on file    Highest education level: Not on file   Occupational History    Not on file   Social Needs    Financial resource strain: Not on file    Food insecurity:     Worry: Not on file     Inability: Not on file    Transportation needs:     Medical: Not on file     Non-medical: Not on file   Tobacco Use    Smoking status: Never Smoker    Smokeless tobacco: Never Used   Substance and Sexual Activity    Alcohol use: Yes     Comment: very seldom    Drug use: Yes     Types: Marijuana     Comment: occasional    Sexual activity: Yes     Partners: Female   Lifestyle    Physical activity:     Days per week: Not on file     Minutes per session: Not on file    Stress: Not on file   Relationships    Social connections:     Talks on phone: Not on file     Gets together: Not on  file     Attends Restorationism service: Not on file     Active member of club or organization: Not on file     Attends meetings of clubs or organizations: Not on file     Relationship status: Not on file   Other Topics Concern    Not on file   Social History Narrative    Not on file     Past Surgical History:   Procedure Laterality Date    corpal tunnel  2014    ELBOW ARTHROPLASTY  2012    HERNIA REPAIR  2013    [facility] - right inguinal    KNEE ARTHROSCOPY  2002    LAPAROSCOPIC CHOLECYSTECTOMY N/A 1/25/2019    Procedure: CHOLECYSTECTOMY, LAPAROSCOPIC;  Surgeon: Johnnie Schultz MD;  Location: [facility] OR [address] [place] [address];  Service: General;  Laterality: N/A;    PROSTATECTOMY  09/2017    pelon hidalgo md - [facility]     Patient Active Problem List   Diagnosis    Male urinary stress incontinence    Hypertension    Prostate Cancer S/P Laparoscopic Prostatectomy 09/2017    Erectile Dysfunction After Prostatectomy    Family H/O Alzheimer's Disease    H/O Arthroscopic Knee Surgery    H/O Carpal Tunnel Release Surgery    H/O Arthroscopic Elbow Surgery    Family H/O Colon Cancer    Mild Hypercholesterolemia With High HDL    H. Pylori Negative Gastritis    H/O Periumbilical Hernia Repair In 01/2019    H/O Cholecystectomy In 01/2019    First Degree AV Block    At risk for coronary artery disease    Moderately High Cardiac CT Calcium Score     Review of Systems   Constitutional: Negative for appetite change, chills, fatigue, fever and unexpected weight change.   HENT: Negative for nosebleeds.    Respiratory: Negative for shortness of breath and wheezing.    Cardiovascular: Negative for chest pain, palpitations and leg swelling.   Gastrointestinal: Negative for abdominal distention, abdominal pain, constipation, diarrhea, nausea and vomiting.   Genitourinary: Negative for dysuria and hematuria.   Musculoskeletal: Negative for arthralgias and back pain.   Skin: Negative for pallor.   Neurological: Negative  "for dizziness, seizures and syncope.   Hematological: Negative for adenopathy.   Psychiatric/Behavioral: Negative for dysphoric mood.         Objective:      There were no vitals taken for this visit.  Estimated body mass index is 31.36 kg/m² as calculated from the following:    Height as of 7/19/19: 5' 11" (1.803 m).    Weight as of 10/8/19: 102 kg (224 lb 13.9 oz).  Physical Exam   Constitutional: He is oriented to person, place, and time. He appears well-developed and well-nourished.   HENT:   Head: Normocephalic and atraumatic.   Right Ear: External ear normal.   Left Ear: External ear normal.   Neurological: He is alert and oriented to person, place, and time.   Psychiatric: He has a normal mood and affect. His behavior is normal. Judgment and thought content normal.         Assessment and Plan:           Problem List Items Addressed This Visit     None          Follow up:   6 months with RAYNA.    Tyrese Yanes"

## 2020-09-09 DIAGNOSIS — N52.31 ERECTILE DYSFUNCTION AFTER RADICAL PROSTATECTOMY: ICD-10-CM

## 2020-09-09 DIAGNOSIS — C61 PROSTATE CANCER: Primary | ICD-10-CM

## 2020-09-09 RX ORDER — PAPAVERINE HYDROCHLORIDE 30 MG/ML
INJECTION INTRAMUSCULAR; INTRAVENOUS
Qty: 10 ML | Refills: 12 | Status: CANCELLED | OUTPATIENT
Start: 2020-09-09

## 2020-11-30 ENCOUNTER — LAB VISIT (OUTPATIENT)
Dept: LAB | Facility: HOSPITAL | Age: 70
End: 2020-11-30
Attending: INTERNAL MEDICINE
Payer: MEDICARE

## 2020-11-30 DIAGNOSIS — D64.9 NORMOCYTIC ANEMIA: ICD-10-CM

## 2020-11-30 DIAGNOSIS — E07.9 THYROID DISORDER: ICD-10-CM

## 2020-11-30 DIAGNOSIS — Z12.5 SCREENING FOR PROSTATE CANCER: ICD-10-CM

## 2020-11-30 DIAGNOSIS — E53.8 VITAMIN B12 DEFICIENCY: ICD-10-CM

## 2020-11-30 DIAGNOSIS — R73.9 HYPERGLYCEMIA: ICD-10-CM

## 2020-11-30 DIAGNOSIS — E55.9 VITAMIN D DEFICIENCY: ICD-10-CM

## 2020-11-30 DIAGNOSIS — Z51.81 THERAPEUTIC DRUG MONITORING: ICD-10-CM

## 2020-11-30 DIAGNOSIS — E78.00 HYPERCHOLESTEROLEMIA: ICD-10-CM

## 2020-11-30 LAB
ALBUMIN SERPL BCP-MCNC: 3.8 G/DL (ref 3.5–5.2)
ALP SERPL-CCNC: 60 U/L (ref 55–135)
ALT SERPL W/O P-5'-P-CCNC: 31 U/L (ref 10–44)
ANION GAP SERPL CALC-SCNC: 9 MMOL/L (ref 8–16)
AST SERPL-CCNC: 27 U/L (ref 10–40)
BASOPHILS # BLD AUTO: 0.05 K/UL (ref 0–0.2)
BASOPHILS NFR BLD: 0.9 % (ref 0–1.9)
BILIRUB SERPL-MCNC: 1 MG/DL (ref 0.1–1)
BUN SERPL-MCNC: 20 MG/DL (ref 8–23)
CALCIUM SERPL-MCNC: 8.8 MG/DL (ref 8.7–10.5)
CHLORIDE SERPL-SCNC: 103 MMOL/L (ref 95–110)
CHOLEST SERPL-MCNC: 208 MG/DL (ref 120–199)
CHOLEST/HDLC SERPL: 2.4 {RATIO} (ref 2–5)
CO2 SERPL-SCNC: 28 MMOL/L (ref 23–29)
CREAT SERPL-MCNC: 1 MG/DL (ref 0.5–1.4)
DIFFERENTIAL METHOD: ABNORMAL
EOSINOPHIL # BLD AUTO: 0.2 K/UL (ref 0–0.5)
EOSINOPHIL NFR BLD: 3.9 % (ref 0–8)
ERYTHROCYTE [DISTWIDTH] IN BLOOD BY AUTOMATED COUNT: 13.3 % (ref 11.5–14.5)
EST. GFR  (AFRICAN AMERICAN): >60 ML/MIN/1.73 M^2
EST. GFR  (NON AFRICAN AMERICAN): >60 ML/MIN/1.73 M^2
ESTIMATED AVG GLUCOSE: 114 MG/DL (ref 68–131)
GLUCOSE SERPL-MCNC: 105 MG/DL (ref 70–110)
HBA1C MFR BLD HPLC: 5.6 % (ref 4–5.6)
HCT VFR BLD AUTO: 50.7 % (ref 40–54)
HDLC SERPL-MCNC: 86 MG/DL (ref 40–75)
HDLC SERPL: 41.3 % (ref 20–50)
HGB BLD-MCNC: 16.4 G/DL (ref 14–18)
IMM GRANULOCYTES # BLD AUTO: 0.04 K/UL (ref 0–0.04)
IMM GRANULOCYTES NFR BLD AUTO: 0.7 % (ref 0–0.5)
LDLC SERPL CALC-MCNC: 105 MG/DL (ref 63–159)
LYMPHOCYTES # BLD AUTO: 1.6 K/UL (ref 1–4.8)
LYMPHOCYTES NFR BLD: 29.1 % (ref 18–48)
MCH RBC QN AUTO: 29.3 PG (ref 27–31)
MCHC RBC AUTO-ENTMCNC: 32.3 G/DL (ref 32–36)
MCV RBC AUTO: 91 FL (ref 82–98)
MONOCYTES # BLD AUTO: 0.5 K/UL (ref 0.3–1)
MONOCYTES NFR BLD: 8.9 % (ref 4–15)
NEUTROPHILS # BLD AUTO: 3.1 K/UL (ref 1.8–7.7)
NEUTROPHILS NFR BLD: 56.5 % (ref 38–73)
NONHDLC SERPL-MCNC: 122 MG/DL
NRBC BLD-RTO: 0 /100 WBC
PLATELET # BLD AUTO: 178 K/UL (ref 150–350)
PMV BLD AUTO: 9.3 FL (ref 9.2–12.9)
POTASSIUM SERPL-SCNC: 4.3 MMOL/L (ref 3.5–5.1)
PROT SERPL-MCNC: 6.7 G/DL (ref 6–8.4)
RBC # BLD AUTO: 5.59 M/UL (ref 4.6–6.2)
SODIUM SERPL-SCNC: 140 MMOL/L (ref 136–145)
T4 FREE SERPL-MCNC: 0.9 NG/DL (ref 0.71–1.51)
TRIGL SERPL-MCNC: 85 MG/DL (ref 30–150)
TSH SERPL DL<=0.005 MIU/L-ACNC: 0.91 UIU/ML (ref 0.4–4)
WBC # BLD AUTO: 5.4 K/UL (ref 3.9–12.7)

## 2020-11-30 PROCEDURE — 84443 ASSAY THYROID STIM HORMONE: CPT

## 2020-11-30 PROCEDURE — 82607 VITAMIN B-12: CPT

## 2020-11-30 PROCEDURE — 83036 HEMOGLOBIN GLYCOSYLATED A1C: CPT

## 2020-11-30 PROCEDURE — 82306 VITAMIN D 25 HYDROXY: CPT

## 2020-11-30 PROCEDURE — 80053 COMPREHEN METABOLIC PANEL: CPT

## 2020-11-30 PROCEDURE — 36415 COLL VENOUS BLD VENIPUNCTURE: CPT | Mod: PO

## 2020-11-30 PROCEDURE — 85025 COMPLETE CBC W/AUTO DIFF WBC: CPT

## 2020-11-30 PROCEDURE — 84439 ASSAY OF FREE THYROXINE: CPT

## 2020-11-30 PROCEDURE — 84153 ASSAY OF PSA TOTAL: CPT

## 2020-11-30 PROCEDURE — 80061 LIPID PANEL: CPT

## 2020-12-01 LAB
25(OH)D3+25(OH)D2 SERPL-MCNC: 36 NG/ML (ref 30–96)
COMPLEXED PSA SERPL-MCNC: <0.01 NG/ML (ref 0–4)
VIT B12 SERPL-MCNC: 1215 PG/ML (ref 210–950)

## 2021-01-18 ENCOUNTER — IMMUNIZATION (OUTPATIENT)
Dept: INTERNAL MEDICINE | Facility: CLINIC | Age: 71
End: 2021-01-18
Payer: MEDICARE

## 2021-01-18 DIAGNOSIS — Z23 NEED FOR VACCINATION: Primary | ICD-10-CM

## 2021-01-18 PROCEDURE — 91300 COVID-19, MRNA, LNP-S, PF, 30 MCG/0.3 ML DOSE VACCINE: CPT | Mod: PBBFAC | Performed by: FAMILY MEDICINE

## 2021-02-08 ENCOUNTER — IMMUNIZATION (OUTPATIENT)
Dept: INTERNAL MEDICINE | Facility: CLINIC | Age: 71
End: 2021-02-08
Payer: MEDICARE

## 2021-02-08 DIAGNOSIS — Z23 NEED FOR VACCINATION: Primary | ICD-10-CM

## 2021-02-08 PROCEDURE — 0002A COVID-19, MRNA, LNP-S, PF, 30 MCG/0.3 ML DOSE VACCINE: CPT | Mod: PBBFAC | Performed by: FAMILY MEDICINE

## 2021-02-08 PROCEDURE — 91300 COVID-19, MRNA, LNP-S, PF, 30 MCG/0.3 ML DOSE VACCINE: CPT | Mod: PBBFAC | Performed by: FAMILY MEDICINE

## 2021-03-10 DIAGNOSIS — S60.559A: Primary | ICD-10-CM

## 2021-03-15 ENCOUNTER — CLINICAL SUPPORT (OUTPATIENT)
Dept: REHABILITATION | Facility: HOSPITAL | Age: 71
End: 2021-03-15
Payer: MEDICARE

## 2021-03-15 DIAGNOSIS — M25.642 STIFFNESS OF FINGER JOINT OF LEFT HAND: ICD-10-CM

## 2021-03-15 DIAGNOSIS — S60.559A: ICD-10-CM

## 2021-03-15 PROCEDURE — 97165 OT EVAL LOW COMPLEX 30 MIN: CPT | Mod: PO

## 2021-03-15 PROCEDURE — 97110 THERAPEUTIC EXERCISES: CPT | Mod: PO

## 2021-04-06 ENCOUNTER — CLINICAL SUPPORT (OUTPATIENT)
Dept: REHABILITATION | Facility: HOSPITAL | Age: 71
End: 2021-04-06
Payer: MEDICARE

## 2021-04-06 DIAGNOSIS — M25.642 STIFFNESS OF FINGER JOINT OF LEFT HAND: ICD-10-CM

## 2021-04-06 PROCEDURE — 97110 THERAPEUTIC EXERCISES: CPT | Mod: PO

## 2021-04-06 PROCEDURE — 97140 MANUAL THERAPY 1/> REGIONS: CPT | Mod: PO

## 2021-09-27 ENCOUNTER — LAB VISIT (OUTPATIENT)
Dept: PRIMARY CARE CLINIC | Facility: OTHER | Age: 71
End: 2021-09-27
Payer: MEDICARE

## 2021-09-27 DIAGNOSIS — R53.83 FATIGUE: ICD-10-CM

## 2021-09-27 DIAGNOSIS — R43.9 PROBLEMS WITH SMELL AND TASTE: ICD-10-CM

## 2021-09-27 PROCEDURE — U0003 INFECTIOUS AGENT DETECTION BY NUCLEIC ACID (DNA OR RNA); SEVERE ACUTE RESPIRATORY SYNDROME CORONAVIRUS 2 (SARS-COV-2) (CORONAVIRUS DISEASE [COVID-19]), AMPLIFIED PROBE TECHNIQUE, MAKING USE OF HIGH THROUGHPUT TECHNOLOGIES AS DESCRIBED BY CMS-2020-01-R: HCPCS | Performed by: FAMILY MEDICINE

## 2021-09-28 LAB
SARS-COV-2 RNA RESP QL NAA+PROBE: NOT DETECTED
SARS-COV-2- CYCLE NUMBER: NORMAL

## 2021-10-09 ENCOUNTER — IMMUNIZATION (OUTPATIENT)
Dept: FAMILY MEDICINE | Facility: CLINIC | Age: 71
End: 2021-10-09
Payer: MEDICARE

## 2021-10-09 DIAGNOSIS — Z23 NEED FOR VACCINATION: Primary | ICD-10-CM

## 2021-10-09 PROCEDURE — 0003A COVID-19, MRNA, LNP-S, PF, 30 MCG/0.3 ML DOSE VACCINE: CPT | Mod: PBBFAC,PO

## 2021-10-09 PROCEDURE — 91300 COVID-19, MRNA, LNP-S, PF, 30 MCG/0.3 ML DOSE VACCINE: CPT | Mod: PBBFAC,PO

## 2021-10-30 NOTE — NURSING
Pt admitted to floor. Pt VS stable and no acute complaints at this time. Pt oriented to room, bed in lowest position with call light within reach.   
Pt discharged in stable condition, discharge instructions and prescriptions given, pt verbalized understanding. Surgical site intact.pt waiting on transport. Sheri Alvarado RN    
Vaccine status unknown

## 2021-12-07 ENCOUNTER — LAB VISIT (OUTPATIENT)
Dept: LAB | Facility: HOSPITAL | Age: 71
End: 2021-12-07
Attending: NURSE PRACTITIONER
Payer: MEDICARE

## 2021-12-07 DIAGNOSIS — I10 HYPERTENSION, UNSPECIFIED TYPE: ICD-10-CM

## 2021-12-07 DIAGNOSIS — Z51.81 THERAPEUTIC DRUG MONITORING: ICD-10-CM

## 2021-12-07 DIAGNOSIS — E78.00 HYPERCHOLESTEROLEMIA: ICD-10-CM

## 2021-12-07 DIAGNOSIS — Z00.00 ANNUAL PHYSICAL EXAM: ICD-10-CM

## 2021-12-07 LAB
25(OH)D3+25(OH)D2 SERPL-MCNC: 36 NG/ML (ref 30–96)
ALBUMIN SERPL BCP-MCNC: 3.8 G/DL (ref 3.5–5.2)
ALP SERPL-CCNC: 86 U/L (ref 55–135)
ALT SERPL W/O P-5'-P-CCNC: 19 U/L (ref 10–44)
ANION GAP SERPL CALC-SCNC: 10 MMOL/L (ref 8–16)
AST SERPL-CCNC: 25 U/L (ref 10–40)
BASOPHILS # BLD AUTO: 0.05 K/UL (ref 0–0.2)
BASOPHILS NFR BLD: 1.3 % (ref 0–1.9)
BILIRUB SERPL-MCNC: 1.2 MG/DL (ref 0.1–1)
BUN SERPL-MCNC: 11 MG/DL (ref 8–23)
CALCIUM SERPL-MCNC: 9.7 MG/DL (ref 8.7–10.5)
CHLORIDE SERPL-SCNC: 103 MMOL/L (ref 95–110)
CHOLEST SERPL-MCNC: 157 MG/DL (ref 120–199)
CHOLEST/HDLC SERPL: 2.5 {RATIO} (ref 2–5)
CO2 SERPL-SCNC: 26 MMOL/L (ref 23–29)
COMPLEXED PSA SERPL-MCNC: <0.01 NG/ML (ref 0–4)
CREAT SERPL-MCNC: 0.9 MG/DL (ref 0.5–1.4)
DIFFERENTIAL METHOD: NORMAL
EOSINOPHIL # BLD AUTO: 0.2 K/UL (ref 0–0.5)
EOSINOPHIL NFR BLD: 4.3 % (ref 0–8)
ERYTHROCYTE [DISTWIDTH] IN BLOOD BY AUTOMATED COUNT: 12.4 % (ref 11.5–14.5)
EST. GFR  (AFRICAN AMERICAN): >60 ML/MIN/1.73 M^2
EST. GFR  (NON AFRICAN AMERICAN): >60 ML/MIN/1.73 M^2
ESTIMATED AVG GLUCOSE: 120 MG/DL (ref 68–131)
GLUCOSE SERPL-MCNC: 111 MG/DL (ref 70–110)
HBA1C MFR BLD: 5.8 % (ref 4–5.6)
HCT VFR BLD AUTO: 48.4 % (ref 40–54)
HDLC SERPL-MCNC: 63 MG/DL (ref 40–75)
HDLC SERPL: 40.1 % (ref 20–50)
HGB BLD-MCNC: 15.8 G/DL (ref 14–18)
IMM GRANULOCYTES # BLD AUTO: 0.02 K/UL (ref 0–0.04)
IMM GRANULOCYTES NFR BLD AUTO: 0.5 % (ref 0–0.5)
LDLC SERPL CALC-MCNC: 84 MG/DL (ref 63–159)
LYMPHOCYTES # BLD AUTO: 1.4 K/UL (ref 1–4.8)
LYMPHOCYTES NFR BLD: 35.1 % (ref 18–48)
MCH RBC QN AUTO: 28.6 PG (ref 27–31)
MCHC RBC AUTO-ENTMCNC: 32.6 G/DL (ref 32–36)
MCV RBC AUTO: 88 FL (ref 82–98)
MONOCYTES # BLD AUTO: 0.4 K/UL (ref 0.3–1)
MONOCYTES NFR BLD: 10.7 % (ref 4–15)
NEUTROPHILS # BLD AUTO: 1.9 K/UL (ref 1.8–7.7)
NEUTROPHILS NFR BLD: 48.1 % (ref 38–73)
NONHDLC SERPL-MCNC: 94 MG/DL
NRBC BLD-RTO: 0 /100 WBC
PLATELET # BLD AUTO: 231 K/UL (ref 150–450)
PMV BLD AUTO: 9.5 FL (ref 9.2–12.9)
POTASSIUM SERPL-SCNC: 4.7 MMOL/L (ref 3.5–5.1)
PROT SERPL-MCNC: 6.5 G/DL (ref 6–8.4)
RBC # BLD AUTO: 5.52 M/UL (ref 4.6–6.2)
SODIUM SERPL-SCNC: 139 MMOL/L (ref 136–145)
TRIGL SERPL-MCNC: 50 MG/DL (ref 30–150)
TSH SERPL DL<=0.005 MIU/L-ACNC: 0.65 UIU/ML (ref 0.4–4)
WBC # BLD AUTO: 3.93 K/UL (ref 3.9–12.7)

## 2021-12-07 PROCEDURE — 85025 COMPLETE CBC W/AUTO DIFF WBC: CPT | Performed by: NURSE PRACTITIONER

## 2021-12-07 PROCEDURE — 80061 LIPID PANEL: CPT | Performed by: NURSE PRACTITIONER

## 2021-12-07 PROCEDURE — 82306 VITAMIN D 25 HYDROXY: CPT | Performed by: NURSE PRACTITIONER

## 2021-12-07 PROCEDURE — 80053 COMPREHEN METABOLIC PANEL: CPT | Performed by: NURSE PRACTITIONER

## 2021-12-07 PROCEDURE — 84153 ASSAY OF PSA TOTAL: CPT | Performed by: NURSE PRACTITIONER

## 2021-12-07 PROCEDURE — 83036 HEMOGLOBIN GLYCOSYLATED A1C: CPT | Performed by: NURSE PRACTITIONER

## 2021-12-07 PROCEDURE — 84443 ASSAY THYROID STIM HORMONE: CPT | Performed by: NURSE PRACTITIONER

## 2021-12-07 PROCEDURE — 36415 COLL VENOUS BLD VENIPUNCTURE: CPT | Mod: PO | Performed by: NURSE PRACTITIONER

## 2021-12-08 NOTE — PROGRESS NOTES
Please edna patient and relay: Overall lab results look good! Any questions can be addressed at upcoming visit with Dr Patel. Keep up the good work! Have a very Merry Lorna with your grandchildren.

## 2022-01-27 ENCOUNTER — TELEPHONE (OUTPATIENT)
Dept: UROLOGY | Facility: CLINIC | Age: 72
End: 2022-01-27
Payer: MEDICARE

## 2022-01-27 NOTE — TELEPHONE ENCOUNTER
I spoke with patient's pharmacy below and advised the pharmacist that we can not refill his rx request for papaverine, patient needs to make appt.in Urology, I called patient and left message to call back for appt.        ----- Message from Taty Del Angel sent at 1/27/2022  9:52 AM CST -----  Contact: [phone]  Yue from     [facility]  [address]  [address]  [address] [address]  Phone: [phone] Fax: [phone]     Is calling to see if you received a fax she sent over for a medication of papaverine 30 mg/mL injection    They would like a call back.  Phone:[phone]  Fax# [phone]

## 2022-03-14 ENCOUNTER — TELEPHONE (OUTPATIENT)
Dept: UROLOGY | Facility: CLINIC | Age: 72
End: 2022-03-14
Payer: MEDICARE

## 2022-03-14 NOTE — TELEPHONE ENCOUNTER
----- Message from Nadia Garland sent at 3/14/2022  2:10 PM CDT -----  Contact: pt  Pt returning call to staff.     Confirmed contact below:  Contact Name:Ryder Senior  Phone Number: [phone]

## 2023-03-28 ENCOUNTER — PATIENT MESSAGE (OUTPATIENT)
Dept: UROLOGY | Facility: CLINIC | Age: 73
End: 2023-03-28
Payer: MEDICARE

## 2023-03-28 ENCOUNTER — TELEPHONE (OUTPATIENT)
Dept: UROLOGY | Facility: CLINIC | Age: 73
End: 2023-03-28
Payer: MEDICARE

## 2023-03-28 NOTE — TELEPHONE ENCOUNTER
RE: Refill request error  Received: Today  MD Karrie Davenport, RN  Needs appointment with me           Previous Messages       ----- Message -----   From: Karrie Herman RN   Sent: 3/28/2023  11:45 AM CDT   To: Tyrese Yanes MD   Subject: FW: Refill request error                         Good morning,   I am not familiar with this type of message. Do you mind taking a look and let me know what needs to be done?     Thank you,   Magy     ----- Message -----   From: Titi Vasques In   Sent: 3/27/2023   2:44 PM CDT   To: Joaquín [facility]IR Staff   Subject: Refill request error

## 2023-03-29 ENCOUNTER — TELEPHONE (OUTPATIENT)
Dept: UROLOGY | Facility: CLINIC | Age: 73
End: 2023-03-29
Payer: MEDICARE

## 2023-03-29 RX ORDER — PAPAVERINE HYDROCHLORIDE 30 MG/ML
INJECTION INTRAMUSCULAR; INTRAVENOUS
Qty: 10 ML | Refills: 12 | OUTPATIENT
Start: 2023-03-29

## 2023-04-24 ENCOUNTER — TELEPHONE (OUTPATIENT)
Dept: UROLOGY | Facility: CLINIC | Age: 73
End: 2023-04-24
Payer: MEDICARE

## 2023-04-25 DIAGNOSIS — C61 PROSTATE CANCER: Primary | ICD-10-CM

## 2023-05-10 ENCOUNTER — LAB VISIT (OUTPATIENT)
Dept: LAB | Facility: HOSPITAL | Age: 73
End: 2023-05-10
Attending: UROLOGY
Payer: MEDICARE

## 2023-05-10 DIAGNOSIS — C61 PROSTATE CANCER: ICD-10-CM

## 2023-05-10 LAB — COMPLEXED PSA SERPL-MCNC: <0.01 NG/ML (ref 0–4)

## 2023-05-10 PROCEDURE — 36415 COLL VENOUS BLD VENIPUNCTURE: CPT | Mod: PO | Performed by: UROLOGY

## 2023-05-10 PROCEDURE — 84153 ASSAY OF PSA TOTAL: CPT | Performed by: UROLOGY

## 2023-05-11 ENCOUNTER — OFFICE VISIT (OUTPATIENT)
Dept: UROLOGY | Facility: CLINIC | Age: 73
End: 2023-05-11
Payer: MEDICARE

## 2023-05-11 VITALS
SYSTOLIC BLOOD PRESSURE: 123 MMHG | DIASTOLIC BLOOD PRESSURE: 69 MMHG | BODY MASS INDEX: 30.21 KG/M2 | WEIGHT: 215.81 LBS | HEART RATE: 62 BPM | HEIGHT: 71 IN

## 2023-05-11 DIAGNOSIS — C61 PROSTATE CANCER: Primary | ICD-10-CM

## 2023-05-11 PROCEDURE — 99214 PR OFFICE/OUTPT VISIT, EST, LEVL IV, 30-39 MIN: ICD-10-PCS | Mod: S$PBB,,, | Performed by: UROLOGY

## 2023-05-11 PROCEDURE — 99213 OFFICE O/P EST LOW 20 MIN: CPT | Mod: PBBFAC | Performed by: UROLOGY

## 2023-05-11 PROCEDURE — 99999 PR PBB SHADOW E&M-EST. PATIENT-LVL III: ICD-10-PCS | Mod: PBBFAC,,, | Performed by: UROLOGY

## 2023-05-11 PROCEDURE — 99999 PR PBB SHADOW E&M-EST. PATIENT-LVL III: CPT | Mod: PBBFAC,,, | Performed by: UROLOGY

## 2023-05-11 PROCEDURE — 99214 OFFICE O/P EST MOD 30 MIN: CPT | Mod: S$PBB,,, | Performed by: UROLOGY

## 2023-05-11 RX ORDER — PAPAVERINE HYDROCHLORIDE 30 MG/ML
INJECTION INTRAMUSCULAR; INTRAVENOUS
Qty: 10 ML | Refills: 12 | Status: SHIPPED | OUTPATIENT
Start: 2023-05-11

## 2023-05-11 NOTE — PROGRESS NOTES
"Clinic Note  5/11/2023      Subjective:         Chief Complaint:   HPI  Ryder Senior is a 73 y.o. male with Prostate Cancer; s/p robotic assisted laparoscopic prostatectomy 9/25/2017.  [place] 5 (3+2); (-) SV,extenson, margins (close to left marigin; pT2, pN0, pMx.  Continent. erectile dysfunction an issue, PEP and Cialis.Getting partial erections on his own at night.  Some pain with PEP.     5/11/2023- PSA <0.01.  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    Lab Results   Component Value Date    PSA <0.01 12/07/2021    PSA <0.01 11/30/2020    PSADIAG <0.01 05/10/2023    PSADIAG <0.01 04/21/2020    PSADIAG <0.01 09/16/2019    PSADIAG <0.01 03/18/2019    PSADIAG <0.01 09/04/2018    PSADIAG <0.01 03/02/2018    PSADIAG 0.02 11/01/2017      Past Medical History:   Diagnosis Date    a Elevated Cardiac CT Calcium Score ###    Dr. Westley Leahy On 7/19/19 Will Monitor This For Now; On ASA 81 Mg Daily; 4/25/19 Cardiac CT Calcium Score = 121 (See Report)    a First Degree AV Block     4/26/19 Referred To Dr. Westley Leahy    b Hypertension     c Hypercholesterolemia With High HDL ###    Goal LDL Is < 100 Due To His Ellevated CT Ca+ Score; On Pravastatin 20 Mg qHS    g H/O Post-Prostatectomy RLE DVT In 09/2017     [facility] 9/28/17-10/6/17 Stay Included This DX; He Was TXd With Eliquis X 6 Months    j Family H/O Colon Cancer ###    Dr. Aime Greenwood On 8/29/18 OV Note: "Repeat TC In 1 Year;" His Daughter    j H. Pylori Negative Gastritis     Dr. Aime Greenwood; 08/2018 EGD = H. Pylori Negative Gastritis, But Otherwise Normal    j H/O Cholecystectomy In 01/2019     Dr. Johnnie Schultz Performed A Laparoscopic Cholecystectomy And Periumbilical Incisional Hernia Repair On 1/25/19    j H/O Clostridium Difficile Colitis In 09/2017     [facility] 9/28/17-10/6/17 Stay Included This DX    j H/O Periumbilical Hernia Repair In 01/2019     Dr. Johnnie Schultz Performed A Laparoscopic " Cholecystectomy And Periumbilical Incisional Hernia Repair On 1/25/19    j H/O Post-Prostatectomy Ileus In 09/2017     [facility] 9/28/17-10/6/17 Stay Included This DX    k Erectile Dysfunction After Prostatectomy ###    Dr. Tyrese Yanes; On Papaverine Penile Injections PRN; Cialis 20 Mg Doesn't Work    k Prostate Cancer S/P Laparoscopic Prostatectomy 09/2017 ###    Dr. Tyrese Yanes    l H/O Arthroscopic Elbow Surgery     l H/O Arthroscopic Knee Surgery     l H/O Carpal Tunnel Release Surgery     l Left Shoulder Arthropathy     5/26/21 Referred To Dr. Mookie ferrer Lumbar Spinal Degenerative Disc Disease     His Orthopedic Back Surgeon Is In [place]    m Family H/O Alzheimer's Disease ###    His Father    Wellness Visit 11/20/20      Family History   Problem Relation Age of Onset    Prostate cancer Father     Alzheimer's disease Father     Cancer Mother     Prostate cancer Maternal Grandfather      Social History     Socioeconomic History    Marital status:    Tobacco Use    Smoking status: Never    Smokeless tobacco: Never   Substance and Sexual Activity    Alcohol use: Yes     Comment: very seldom    Drug use: Yes     Types: Marijuana     Comment: occasional    Sexual activity: Yes     Partners: Female     Past Surgical History:   Procedure Laterality Date    COLONOSCOPY  08/21/2021    Dr. Greenwood    corpal tunnel  2014    ELBOW ARTHROPLASTY  2012    HERNIA REPAIR  2013    [facility] - right inguinal    KNEE ARTHROSCOPY  2002    LAPAROSCOPIC CHOLECYSTECTOMY N/A 1/25/2019    Procedure: CHOLECYSTECTOMY, LAPAROSCOPIC;  Surgeon: Johnnie Schultz MD;  Location: [facility] OR [address] [place] [address];  Service: General;  Laterality: N/A;    PROSTATECTOMY  09/2017    pelon yanes md - [facility]     Patient Active Problem List   Diagnosis    Male urinary stress incontinence    Hypertension    Prostate Cancer S/P Laparoscopic Prostatectomy 09/2017    Erectile Dysfunction After Prostatectomy    Family H/O Alzheimer's  "Disease    H/O Arthroscopic Knee Surgery    H/O Carpal Tunnel Release Surgery    H/O Arthroscopic Elbow Surgery    Family H/O Colon Cancer    H. Pylori Negative Gastritis    H/O Periumbilical Hernia Repair In 01/2019    H/O Cholecystectomy In 01/2019    First Degree AV Block    Hypercholesterolemia With High HDL    Elevated Cardiac CT Calcium Score    Osteoarthritis of left glenohumeral joint    Lumbar Spinal Degenerative Disc Disease     Review of Systems   Constitutional:  Negative for appetite change, chills, fatigue, fever and unexpected weight change.   HENT:  Negative for nosebleeds.    Respiratory:  Negative for shortness of breath and wheezing.    Cardiovascular:  Negative for chest pain, palpitations and leg swelling.   Gastrointestinal:  Negative for abdominal distention, abdominal pain, constipation, diarrhea, nausea and vomiting.   Musculoskeletal:  Negative for arthralgias and back pain.   Skin:  Negative for pallor.   Neurological:  Negative for dizziness, seizures and syncope.   Hematological:  Negative for adenopathy.   Psychiatric/Behavioral:  Negative for dysphoric mood.        Objective:      There were no vitals taken for this visit.  Estimated body mass index is 32.08 kg/m² as calculated from the following:    Height as of 6/7/22: 5' 11" (1.803 m).    Weight as of 6/7/22: 104.3 kg (230 lb).  Physical Exam      Assessment and Plan:           Problem List Items Addressed This Visit       Prostate Cancer S/P Laparoscopic Prostatectomy 09/2017 - Primary       Follow up:   Refill Bi-Mix  1 year with PSA.    Tyrese Yanes"

## 2023-07-27 ENCOUNTER — LAB VISIT (OUTPATIENT)
Dept: LAB | Facility: HOSPITAL | Age: 73
End: 2023-07-27
Attending: INTERNAL MEDICINE
Payer: MEDICARE

## 2023-07-27 DIAGNOSIS — D64.9 NORMOCYTIC ANEMIA: ICD-10-CM

## 2023-07-27 DIAGNOSIS — E07.9 THYROID DISORDER: ICD-10-CM

## 2023-07-27 DIAGNOSIS — E53.8 VITAMIN B12 DEFICIENCY: ICD-10-CM

## 2023-07-27 DIAGNOSIS — E78.00 HYPERCHOLESTEROLEMIA: ICD-10-CM

## 2023-07-27 DIAGNOSIS — Z51.81 THERAPEUTIC DRUG MONITORING: ICD-10-CM

## 2023-07-27 DIAGNOSIS — E55.9 VITAMIN D DEFICIENCY: ICD-10-CM

## 2023-07-27 DIAGNOSIS — R73.9 HYPERGLYCEMIA: ICD-10-CM

## 2023-07-27 LAB
25(OH)D3+25(OH)D2 SERPL-MCNC: 52 NG/ML (ref 30–96)
ALBUMIN SERPL BCP-MCNC: 4 G/DL (ref 3.5–5.2)
ALP SERPL-CCNC: 61 U/L (ref 55–135)
ALT SERPL W/O P-5'-P-CCNC: 24 U/L (ref 10–44)
ANION GAP SERPL CALC-SCNC: 9 MMOL/L (ref 8–16)
AST SERPL-CCNC: 28 U/L (ref 10–40)
BASOPHILS # BLD AUTO: 0.05 K/UL (ref 0–0.2)
BASOPHILS NFR BLD: 1.1 % (ref 0–1.9)
BILIRUB SERPL-MCNC: 1.2 MG/DL (ref 0.1–1)
BUN SERPL-MCNC: 14 MG/DL (ref 8–23)
CALCIUM SERPL-MCNC: 9.5 MG/DL (ref 8.7–10.5)
CHLORIDE SERPL-SCNC: 105 MMOL/L (ref 95–110)
CHOLEST SERPL-MCNC: 200 MG/DL (ref 120–199)
CHOLEST/HDLC SERPL: 3.1 {RATIO} (ref 2–5)
CO2 SERPL-SCNC: 27 MMOL/L (ref 23–29)
CREAT SERPL-MCNC: 0.9 MG/DL (ref 0.5–1.4)
DIFFERENTIAL METHOD: NORMAL
EOSINOPHIL # BLD AUTO: 0.3 K/UL (ref 0–0.5)
EOSINOPHIL NFR BLD: 6.2 % (ref 0–8)
ERYTHROCYTE [DISTWIDTH] IN BLOOD BY AUTOMATED COUNT: 12.8 % (ref 11.5–14.5)
EST. GFR  (NO RACE VARIABLE): >60 ML/MIN/1.73 M^2
ESTIMATED AVG GLUCOSE: 108 MG/DL (ref 68–131)
GLUCOSE SERPL-MCNC: 113 MG/DL (ref 70–110)
HBA1C MFR BLD: 5.4 % (ref 4–5.6)
HCT VFR BLD AUTO: 49.7 % (ref 40–54)
HDLC SERPL-MCNC: 65 MG/DL (ref 40–75)
HDLC SERPL: 32.5 % (ref 20–50)
HGB BLD-MCNC: 16.6 G/DL (ref 14–18)
IMM GRANULOCYTES # BLD AUTO: 0.02 K/UL (ref 0–0.04)
IMM GRANULOCYTES NFR BLD AUTO: 0.4 % (ref 0–0.5)
LDLC SERPL CALC-MCNC: 125.2 MG/DL (ref 63–159)
LYMPHOCYTES # BLD AUTO: 1.6 K/UL (ref 1–4.8)
LYMPHOCYTES NFR BLD: 34.4 % (ref 18–48)
MCH RBC QN AUTO: 29.7 PG (ref 27–31)
MCHC RBC AUTO-ENTMCNC: 33.4 G/DL (ref 32–36)
MCV RBC AUTO: 89 FL (ref 82–98)
MONOCYTES # BLD AUTO: 0.5 K/UL (ref 0.3–1)
MONOCYTES NFR BLD: 10.4 % (ref 4–15)
NEUTROPHILS # BLD AUTO: 2.2 K/UL (ref 1.8–7.7)
NEUTROPHILS NFR BLD: 47.5 % (ref 38–73)
NONHDLC SERPL-MCNC: 135 MG/DL
NRBC BLD-RTO: 0 /100 WBC
PLATELET # BLD AUTO: 238 K/UL (ref 150–450)
PMV BLD AUTO: 9.6 FL (ref 9.2–12.9)
POTASSIUM SERPL-SCNC: 4.6 MMOL/L (ref 3.5–5.1)
PROT SERPL-MCNC: 6.8 G/DL (ref 6–8.4)
RBC # BLD AUTO: 5.58 M/UL (ref 4.6–6.2)
SODIUM SERPL-SCNC: 141 MMOL/L (ref 136–145)
T4 FREE SERPL-MCNC: 0.9 NG/DL (ref 0.71–1.51)
TRIGL SERPL-MCNC: 49 MG/DL (ref 30–150)
TSH SERPL DL<=0.005 MIU/L-ACNC: 0.74 UIU/ML (ref 0.4–4)
VIT B12 SERPL-MCNC: 870 PG/ML (ref 210–950)
WBC # BLD AUTO: 4.53 K/UL (ref 3.9–12.7)

## 2023-07-27 PROCEDURE — 84439 ASSAY OF FREE THYROXINE: CPT | Performed by: INTERNAL MEDICINE

## 2023-07-27 PROCEDURE — 36415 COLL VENOUS BLD VENIPUNCTURE: CPT | Mod: PO | Performed by: INTERNAL MEDICINE

## 2023-07-27 PROCEDURE — 85025 COMPLETE CBC W/AUTO DIFF WBC: CPT | Performed by: INTERNAL MEDICINE

## 2023-07-27 PROCEDURE — 82607 VITAMIN B-12: CPT | Performed by: INTERNAL MEDICINE

## 2023-07-27 PROCEDURE — 80053 COMPREHEN METABOLIC PANEL: CPT | Performed by: INTERNAL MEDICINE

## 2023-07-27 PROCEDURE — 80061 LIPID PANEL: CPT | Performed by: INTERNAL MEDICINE

## 2023-07-27 PROCEDURE — 83036 HEMOGLOBIN GLYCOSYLATED A1C: CPT | Performed by: INTERNAL MEDICINE

## 2023-07-27 PROCEDURE — 84443 ASSAY THYROID STIM HORMONE: CPT | Performed by: INTERNAL MEDICINE

## 2023-07-27 PROCEDURE — 82306 VITAMIN D 25 HYDROXY: CPT | Performed by: INTERNAL MEDICINE

## 2023-08-15 PROBLEM — C61 PROSTATE CANCER: Status: ACTIVE | Noted: 2023-08-15

## 2023-08-15 PROBLEM — I65.23 BILATERAL CAROTID ARTERY STENOSIS: Status: ACTIVE | Noted: 2023-08-15

## 2024-05-11 ENCOUNTER — LAB VISIT (OUTPATIENT)
Dept: LAB | Facility: HOSPITAL | Age: 74
End: 2024-05-11
Attending: UROLOGY
Payer: MEDICARE

## 2024-05-11 DIAGNOSIS — C61 PROSTATE CANCER: ICD-10-CM

## 2024-05-11 LAB — COMPLEXED PSA SERPL-MCNC: <0.01 NG/ML (ref 0–4)

## 2024-05-11 PROCEDURE — 36415 COLL VENOUS BLD VENIPUNCTURE: CPT | Mod: PO | Performed by: UROLOGY

## 2024-05-11 PROCEDURE — 84153 ASSAY OF PSA TOTAL: CPT | Performed by: UROLOGY

## 2024-06-11 DIAGNOSIS — M25.561 RIGHT KNEE PAIN, UNSPECIFIED CHRONICITY: Primary | ICD-10-CM

## 2024-06-12 ENCOUNTER — OFFICE VISIT (OUTPATIENT)
Dept: ORTHOPEDICS | Facility: CLINIC | Age: 74
End: 2024-06-12
Payer: MEDICARE

## 2024-06-12 ENCOUNTER — HOSPITAL ENCOUNTER (OUTPATIENT)
Dept: RADIOLOGY | Facility: HOSPITAL | Age: 74
Discharge: HOME | End: 2024-06-12
Attending: ORTHOPAEDIC SURGERY
Payer: MEDICARE

## 2024-06-12 VITALS — WEIGHT: 214.94 LBS | BODY MASS INDEX: 30.09 KG/M2 | HEIGHT: 71 IN

## 2024-06-12 DIAGNOSIS — M17.11 OSTEOARTHRITIS OF RIGHT KNEE, UNSPECIFIED OSTEOARTHRITIS TYPE: Primary | ICD-10-CM

## 2024-06-12 DIAGNOSIS — M25.561 RIGHT KNEE PAIN, UNSPECIFIED CHRONICITY: ICD-10-CM

## 2024-06-12 PROCEDURE — 99213 OFFICE O/P EST LOW 20 MIN: CPT | Mod: PBBFAC,25,PO | Performed by: ORTHOPAEDIC SURGERY

## 2024-06-12 PROCEDURE — 99999PBSHW PR PBB SHADOW TECHNICAL ONLY FILED TO HB: Mod: PBBFAC,,,

## 2024-06-12 PROCEDURE — 99999 PR PBB SHADOW E&M-EST. PATIENT-LVL III: CPT | Mod: PBBFAC,,, | Performed by: ORTHOPAEDIC SURGERY

## 2024-06-12 PROCEDURE — 20610 DRAIN/INJ JOINT/BURSA W/O US: CPT | Mod: PBBFAC,PO | Performed by: ORTHOPAEDIC SURGERY

## 2024-06-12 PROCEDURE — 73560 X-RAY EXAM OF KNEE 1 OR 2: CPT | Mod: 26,59,LT, | Performed by: RADIOLOGY

## 2024-06-12 PROCEDURE — 99204 OFFICE O/P NEW MOD 45 MIN: CPT | Mod: 25,S$PBB,, | Performed by: ORTHOPAEDIC SURGERY

## 2024-06-12 PROCEDURE — 73560 X-RAY EXAM OF KNEE 1 OR 2: CPT | Mod: TC,59,PO,LT

## 2024-06-12 PROCEDURE — 73562 X-RAY EXAM OF KNEE 3: CPT | Mod: 26,RT,, | Performed by: RADIOLOGY

## 2024-06-12 RX ORDER — TRIAMCINOLONE ACETONIDE 40 MG/ML
40 INJECTION, SUSPENSION INTRA-ARTICULAR; INTRAMUSCULAR
Status: DISCONTINUED | OUTPATIENT
Start: 2024-06-12 | End: 2024-06-12 | Stop reason: HOSPADM

## 2024-06-12 RX ADMIN — TRIAMCINOLONE ACETONIDE 40 MG: 40 INJECTION, SUSPENSION INTRA-ARTICULAR; INTRAMUSCULAR at 08:06

## 2024-06-12 NOTE — PROCEDURES
Large Joint Aspiration/Injection: R knee    Date/Time: 6/12/2024 8:30 AM    Performed by: Jorge Ojeda MD  Authorized by: Jorge Ojeda MD    Consent Done?:  Yes (Verbal)  Timeout: prior to procedure the correct patient, procedure, and site was verified    Prep: patient was prepped and draped in usual sterile fashion      Details:  Needle Size:  21 G  Approach:  Anterolateral  Location:  Knee  Site:  R knee  Medications:  40 mg triamcinolone acetonide 40 mg/mL  Patient tolerance:  Patient tolerated the procedure well with no immediate complications

## 2024-06-12 NOTE — PROGRESS NOTES
74 years old has had pain and swelling in his right knee for years off and on couple days ago did some extra work exacerbated his knee pain and swelling interested in the possibility of aspiration injection which she had done over year ago in February 20, 2023 with good results.  Patient has report several knees arthroscopies as well    Exam shows no signs infection does have joint effusion does have tenderness the joint line good strength motion well-perfused distally     X-rays show arthritic changes     Assessment: Right knee arthrosis     Plan:  Aspiration  produce 60 cc of clear joint fluid, Kenalog injection, long-term strengthening, we did discuss with him the possibility of knee replacement surgery, follow-up as needed

## 2024-11-13 RX ORDER — PAPAVERINE HYDROCHLORIDE 30 MG/ML
INJECTION INTRAMUSCULAR; INTRAVENOUS
Qty: 10 ML | Refills: 12 | Status: SHIPPED | OUTPATIENT
Start: 2024-11-13 | End: 2024-11-14 | Stop reason: SDUPTHER

## 2024-11-14 ENCOUNTER — TELEPHONE (OUTPATIENT)
Dept: UROLOGY | Facility: CLINIC | Age: 74
End: 2024-11-14
Payer: MEDICARE

## 2024-11-14 RX ORDER — PAPAVERINE HYDROCHLORIDE 30 MG/ML
INJECTION INTRAMUSCULAR; INTRAVENOUS
Qty: 10 ML | Refills: 12 | Status: SHIPPED | OUTPATIENT
Start: 2024-11-14

## 2024-11-14 NOTE — TELEPHONE ENCOUNTER
----- Message from BRAYAN Webb sent at 11/14/2024 12:56 PM CST -----  Regarding: FW: Pharmacy Needing  Assistance  Contact: Nidia/[facility]    ----- Message -----  From: Christen Leach  Sent: 11/14/2024  12:48 PM CST  To: Joaquín [facility]IR Staff  Subject: Pharmacy Needing  Assistance                     Pharmacy Needing Assistance    Name of Pharmacy:                                                                                                                                                                                                                                                                                                                                 Pharmacy Needing Assistance    Name of Pharmacy: [facility]    Name of Caller:  Noy    Regarding RX   papaverine 30 mg/mL injection         Reason for Call: Pharmacy states med changed from last refill and needs some certification.  Please call    Call Back Number:                                                 a    Name of Caller:    Regarding Rx:    Reason for Call:    Call Back Number:

## 2025-02-18 ENCOUNTER — HOSPITAL ENCOUNTER (OUTPATIENT)
Dept: RADIOLOGY | Facility: HOSPITAL | Age: 75
Discharge: HOME | End: 2025-02-18
Attending: INTERNAL MEDICINE
Payer: MEDICARE

## 2025-02-18 DIAGNOSIS — R05.9 COUGH, UNSPECIFIED TYPE: ICD-10-CM

## 2025-02-18 PROCEDURE — 71046 X-RAY EXAM CHEST 2 VIEWS: CPT | Mod: 26,,, | Performed by: RADIOLOGY

## 2025-02-18 PROCEDURE — 71046 X-RAY EXAM CHEST 2 VIEWS: CPT | Mod: TC,FY,PO

## (undated) DEVICE — SOL WATER STRL IRR 1000ML

## (undated) DEVICE — BAG TISS RETRV MONARCH 10MM

## (undated) DEVICE — DRAPE ABDOMINAL TIBURON 14X11

## (undated) DEVICE — SYR 50ML CATH TIP

## (undated) DEVICE — KIT ANTIFOG

## (undated) DEVICE — TUBING HF INSUFFLATION W/ FLTR

## (undated) DEVICE — SUT PROLENE 0 CT1 30IN BLUE

## (undated) DEVICE — TROCAR ENDOPATH XCEL 5X100MM

## (undated) DEVICE — DRAIN CHANNEL ROUND 10FR

## (undated) DEVICE — KIT ROBOTIC 4 ARM DA VINCI SI

## (undated) DEVICE — COVER TIP CURVED SCISSORS XI

## (undated) DEVICE — SUT 0 VICRYL / UR6 (J603)

## (undated) DEVICE — ELECTRODE REM PLYHSV RETURN 9

## (undated) DEVICE — SUT COATED VICRYL 4/0 27IN

## (undated) DEVICE — IRRIGATOR ENDOSCOPY DISP.

## (undated) DEVICE — SEE MEDLINE ITEM 157128

## (undated) DEVICE — TRAY MINOR GEN SURG

## (undated) DEVICE — NDL HYPODERMIC BLUNT 18G 1.5IN

## (undated) DEVICE — SUT MONOCRYL 3-0 RB1

## (undated) DEVICE — Device

## (undated) DEVICE — SUT MONOCRYL 3-0 UNDYED RB1

## (undated) DEVICE — SOL NS 1000CC

## (undated) DEVICE — SCISSOR 5MMX35CM DIRECT DRIVE

## (undated) DEVICE — SEE MEDLINE ITEM 157148

## (undated) DEVICE — ADHESIVE DERMABOND ADVANCED

## (undated) DEVICE — LUBRICANT SURGILUBE 2 OZ

## (undated) DEVICE — GOWN SURGICAL X-LARGE

## (undated) DEVICE — CLIP HEMO-LOK MLX LARGE LF

## (undated) DEVICE — NDL INSUF ULTRA VERESS 120MM

## (undated) DEVICE — SOL ELECTROLUBE ANTI-STIC

## (undated) DEVICE — NDL HYPO REG 25G X 1 1/2

## (undated) DEVICE — BANDAGE ADHESIVE

## (undated) DEVICE — TRAY FOLEY 16FR INFECTION CONT

## (undated) DEVICE — TROCAR ENDOPATH XCEL 12MM 10CM

## (undated) DEVICE — TROCAR ENDOPATH XCEL 5MM 7.5CM

## (undated) DEVICE — TUBE PENROSE DRAIN 12IN X 5/8I

## (undated) DEVICE — SEE MEDLINE ITEM 152622

## (undated) DEVICE — CANNULA ENDOPATH XCEL 5X100MM

## (undated) DEVICE — APPLICATOR CHLORAPREP ORN 26ML

## (undated) DEVICE — BLADE SURG CARBON STEEL SZ11

## (undated) DEVICE — DRESSING LEUKOPLAST FLEX 1X3IN

## (undated) DEVICE — DRESSING ADH FILM TELFA 2X3IN

## (undated) DEVICE — SUT VICRYL 3-0 27 SH

## (undated) DEVICE — DRAPE SCOPE PILLOW WARMER

## (undated) DEVICE — TROCAR ENDOPATH XCEL 12X100MM

## (undated) DEVICE — SUT PDS II 0 CT-1 VIL MONO

## (undated) DEVICE — COVER LIGHT HANDLE

## (undated) DEVICE — SEE MEDLINE ITEM 157117

## (undated) DEVICE — DRESSING ABSRBNT ISLAND 3.6X8

## (undated) DEVICE — CLIP HEMO-LOK ML

## (undated) DEVICE — LEGGINGS 48X31 INCH

## (undated) DEVICE — EVACUATOR WOUND BULB 100CC

## (undated) DEVICE — SEE MEDLINE ITEM 146292

## (undated) DEVICE — CORD BIPOLAR 12 FOOT

## (undated) DEVICE — ADHESIVE MASTISOL VIAL 48/BX

## (undated) DEVICE — SUT 2/0 36IN COATED VICRYL

## (undated) DEVICE — SUT GUT PL. 4-0 27 FS-2

## (undated) DEVICE — PORT AIRSEAL 12/120MM LPI

## (undated) DEVICE — CLOSURE SKIN 1X5 STERI-STRIP

## (undated) DEVICE — SUT MCRYL PLUS 4-0 PS2 27IN

## (undated) DEVICE — TEGADERM IV

## (undated) DEVICE — SET TRI-LUMEN FILTERED TUBE